# Patient Record
Sex: MALE | NOT HISPANIC OR LATINO | URBAN - METROPOLITAN AREA
[De-identification: names, ages, dates, MRNs, and addresses within clinical notes are randomized per-mention and may not be internally consistent; named-entity substitution may affect disease eponyms.]

---

## 2023-02-18 ENCOUNTER — INPATIENT (INPATIENT)
Facility: HOSPITAL | Age: 33
LOS: 5 days | Discharge: HOME | DRG: 711 | End: 2023-02-24
Attending: SURGERY | Admitting: SURGERY
Payer: MEDICAID

## 2023-02-18 VITALS — WEIGHT: 220.02 LBS

## 2023-02-18 DIAGNOSIS — L02.416 CUTANEOUS ABSCESS OF LEFT LOWER LIMB: ICD-10-CM

## 2023-02-18 DIAGNOSIS — M60.009 INFECTIVE MYOSITIS, UNSPECIFIED SITE: ICD-10-CM

## 2023-02-18 LAB
ALBUMIN SERPL ELPH-MCNC: 2.8 G/DL — LOW (ref 3.3–5)
ALP SERPL-CCNC: 44 U/L — SIGNIFICANT CHANGE UP (ref 40–120)
ALT FLD-CCNC: 325 U/L — HIGH (ref 12–78)
ANION GAP SERPL CALC-SCNC: 4 MMOL/L — LOW (ref 5–17)
APTT BLD: 29.7 SEC — SIGNIFICANT CHANGE UP (ref 27.5–35.5)
AST SERPL-CCNC: 155 U/L — HIGH (ref 15–37)
BASOPHILS # BLD AUTO: 0.05 K/UL — SIGNIFICANT CHANGE UP (ref 0–0.2)
BASOPHILS NFR BLD AUTO: 0.4 % — SIGNIFICANT CHANGE UP (ref 0–2)
BILIRUB SERPL-MCNC: 0.4 MG/DL — SIGNIFICANT CHANGE UP (ref 0.2–1.2)
BUN SERPL-MCNC: 20 MG/DL — SIGNIFICANT CHANGE UP (ref 7–23)
CALCIUM SERPL-MCNC: 9 MG/DL — SIGNIFICANT CHANGE UP (ref 8.5–10.1)
CHLORIDE SERPL-SCNC: 103 MMOL/L — SIGNIFICANT CHANGE UP (ref 96–108)
CO2 SERPL-SCNC: 27 MMOL/L — SIGNIFICANT CHANGE UP (ref 22–31)
CREAT SERPL-MCNC: 0.97 MG/DL — SIGNIFICANT CHANGE UP (ref 0.5–1.3)
EGFR: 107 ML/MIN/1.73M2 — SIGNIFICANT CHANGE UP
EOSINOPHIL # BLD AUTO: 0.03 K/UL — SIGNIFICANT CHANGE UP (ref 0–0.5)
EOSINOPHIL NFR BLD AUTO: 0.3 % — SIGNIFICANT CHANGE UP (ref 0–6)
FLUAV AG NPH QL: SIGNIFICANT CHANGE UP
FLUBV AG NPH QL: SIGNIFICANT CHANGE UP
GLUCOSE SERPL-MCNC: 119 MG/DL — HIGH (ref 70–99)
HCT VFR BLD CALC: 38.6 % — LOW (ref 39–50)
HGB BLD-MCNC: 13.1 G/DL — SIGNIFICANT CHANGE UP (ref 13–17)
IMM GRANULOCYTES NFR BLD AUTO: 0.4 % — SIGNIFICANT CHANGE UP (ref 0–0.9)
INR BLD: 1.9 RATIO — HIGH (ref 0.88–1.16)
LACTATE SERPL-SCNC: 1.1 MMOL/L — SIGNIFICANT CHANGE UP (ref 0.7–2)
LYMPHOCYTES # BLD AUTO: 1.05 K/UL — SIGNIFICANT CHANGE UP (ref 1–3.3)
LYMPHOCYTES # BLD AUTO: 8.8 % — LOW (ref 13–44)
MCHC RBC-ENTMCNC: 28.2 PG — SIGNIFICANT CHANGE UP (ref 27–34)
MCHC RBC-ENTMCNC: 33.9 GM/DL — SIGNIFICANT CHANGE UP (ref 32–36)
MCV RBC AUTO: 83.2 FL — SIGNIFICANT CHANGE UP (ref 80–100)
MONOCYTES # BLD AUTO: 1.16 K/UL — HIGH (ref 0–0.9)
MONOCYTES NFR BLD AUTO: 9.8 % — SIGNIFICANT CHANGE UP (ref 2–14)
NEUTROPHILS # BLD AUTO: 9.53 K/UL — HIGH (ref 1.8–7.4)
NEUTROPHILS NFR BLD AUTO: 80.3 % — HIGH (ref 43–77)
PLATELET # BLD AUTO: 395 K/UL — SIGNIFICANT CHANGE UP (ref 150–400)
POTASSIUM SERPL-MCNC: 4.2 MMOL/L — SIGNIFICANT CHANGE UP (ref 3.5–5.3)
POTASSIUM SERPL-SCNC: 4.2 MMOL/L — SIGNIFICANT CHANGE UP (ref 3.5–5.3)
PROT SERPL-MCNC: 7.8 GM/DL — SIGNIFICANT CHANGE UP (ref 6–8.3)
PROTHROM AB SERPL-ACNC: 22.2 SEC — HIGH (ref 10.5–13.4)
RBC # BLD: 4.64 M/UL — SIGNIFICANT CHANGE UP (ref 4.2–5.8)
RBC # FLD: 14.1 % — SIGNIFICANT CHANGE UP (ref 10.3–14.5)
RSV RNA NPH QL NAA+NON-PROBE: SIGNIFICANT CHANGE UP
SARS-COV-2 RNA SPEC QL NAA+PROBE: SIGNIFICANT CHANGE UP
SODIUM SERPL-SCNC: 134 MMOL/L — LOW (ref 135–145)
WBC # BLD: 11.87 K/UL — HIGH (ref 3.8–10.5)
WBC # FLD AUTO: 11.87 K/UL — HIGH (ref 3.8–10.5)

## 2023-02-18 PROCEDURE — 83690 ASSAY OF LIPASE: CPT

## 2023-02-18 PROCEDURE — 99285 EMERGENCY DEPT VISIT HI MDM: CPT

## 2023-02-18 PROCEDURE — 85230 CLOT FACTOR VII PROCONVERTIN: CPT

## 2023-02-18 PROCEDURE — 87070 CULTURE OTHR SPECIMN AEROBIC: CPT

## 2023-02-18 PROCEDURE — 83735 ASSAY OF MAGNESIUM: CPT

## 2023-02-18 PROCEDURE — 84443 ASSAY THYROID STIM HORMONE: CPT

## 2023-02-18 PROCEDURE — 81003 URINALYSIS AUTO W/O SCOPE: CPT

## 2023-02-18 PROCEDURE — 84590 ASSAY OF VITAMIN A: CPT

## 2023-02-18 PROCEDURE — 85384 FIBRINOGEN ACTIVITY: CPT

## 2023-02-18 PROCEDURE — 82728 ASSAY OF FERRITIN: CPT

## 2023-02-18 PROCEDURE — 83550 IRON BINDING TEST: CPT

## 2023-02-18 PROCEDURE — 84425 ASSAY OF VITAMIN B-1: CPT

## 2023-02-18 PROCEDURE — 0241U: CPT

## 2023-02-18 PROCEDURE — 84630 ASSAY OF ZINC: CPT

## 2023-02-18 PROCEDURE — 83036 HEMOGLOBIN GLYCOSYLATED A1C: CPT

## 2023-02-18 PROCEDURE — 87077 CULTURE AEROBIC IDENTIFY: CPT

## 2023-02-18 PROCEDURE — 84484 ASSAY OF TROPONIN QUANT: CPT

## 2023-02-18 PROCEDURE — 85379 FIBRIN DEGRADATION QUANT: CPT

## 2023-02-18 PROCEDURE — 80076 HEPATIC FUNCTION PANEL: CPT

## 2023-02-18 PROCEDURE — 93005 ELECTROCARDIOGRAM TRACING: CPT

## 2023-02-18 PROCEDURE — 82533 TOTAL CORTISOL: CPT

## 2023-02-18 PROCEDURE — 85025 COMPLETE CBC W/AUTO DIFF WBC: CPT

## 2023-02-18 PROCEDURE — 80061 LIPID PANEL: CPT

## 2023-02-18 PROCEDURE — 84207 ASSAY OF VITAMIN B-6: CPT

## 2023-02-18 PROCEDURE — 80202 ASSAY OF VANCOMYCIN: CPT

## 2023-02-18 PROCEDURE — 83615 LACTATE (LD) (LDH) ENZYME: CPT

## 2023-02-18 PROCEDURE — 84100 ASSAY OF PHOSPHORUS: CPT

## 2023-02-18 PROCEDURE — 82746 ASSAY OF FOLIC ACID SERUM: CPT

## 2023-02-18 PROCEDURE — 80074 ACUTE HEPATITIS PANEL: CPT

## 2023-02-18 PROCEDURE — 85014 HEMATOCRIT: CPT

## 2023-02-18 PROCEDURE — 83880 ASSAY OF NATRIURETIC PEPTIDE: CPT

## 2023-02-18 PROCEDURE — 82306 VITAMIN D 25 HYDROXY: CPT

## 2023-02-18 PROCEDURE — 80048 BASIC METABOLIC PNL TOTAL CA: CPT

## 2023-02-18 PROCEDURE — 73701 CT LOWER EXTREMITY W/DYE: CPT | Mod: LT

## 2023-02-18 PROCEDURE — 82607 VITAMIN B-12: CPT

## 2023-02-18 PROCEDURE — G1004: CPT

## 2023-02-18 PROCEDURE — 82550 ASSAY OF CK (CPK): CPT

## 2023-02-18 PROCEDURE — 84446 ASSAY OF VITAMIN E: CPT

## 2023-02-18 PROCEDURE — 93970 EXTREMITY STUDY: CPT

## 2023-02-18 PROCEDURE — 82150 ASSAY OF AMYLASE: CPT

## 2023-02-18 PROCEDURE — 85610 PROTHROMBIN TIME: CPT

## 2023-02-18 PROCEDURE — 85027 COMPLETE CBC AUTOMATED: CPT

## 2023-02-18 PROCEDURE — 76700 US EXAM ABDOM COMPLETE: CPT

## 2023-02-18 PROCEDURE — 86140 C-REACTIVE PROTEIN: CPT

## 2023-02-18 PROCEDURE — 85730 THROMBOPLASTIN TIME PARTIAL: CPT

## 2023-02-18 PROCEDURE — 71045 X-RAY EXAM CHEST 1 VIEW: CPT

## 2023-02-18 PROCEDURE — 85018 HEMOGLOBIN: CPT

## 2023-02-18 PROCEDURE — 83540 ASSAY OF IRON: CPT

## 2023-02-18 PROCEDURE — 82140 ASSAY OF AMMONIA: CPT

## 2023-02-18 PROCEDURE — 73701 CT LOWER EXTREMITY W/DYE: CPT | Mod: 26,LT,MG

## 2023-02-18 PROCEDURE — 80053 COMPREHEN METABOLIC PANEL: CPT

## 2023-02-18 PROCEDURE — 36415 COLL VENOUS BLD VENIPUNCTURE: CPT

## 2023-02-18 PROCEDURE — 12345: CPT | Mod: NC

## 2023-02-18 RX ORDER — METOCLOPRAMIDE HCL 10 MG
10 TABLET ORAL EVERY 6 HOURS
Refills: 0 | Status: DISCONTINUED | OUTPATIENT
Start: 2023-02-18 | End: 2023-02-24

## 2023-02-18 RX ORDER — IBUPROFEN 200 MG
1 TABLET ORAL
Qty: 0 | Refills: 0 | DISCHARGE

## 2023-02-18 RX ORDER — MORPHINE SULFATE 50 MG/1
4 CAPSULE, EXTENDED RELEASE ORAL ONCE
Refills: 0 | Status: DISCONTINUED | OUTPATIENT
Start: 2023-02-18 | End: 2023-02-18

## 2023-02-18 RX ORDER — ONDANSETRON 8 MG/1
4 TABLET, FILM COATED ORAL EVERY 6 HOURS
Refills: 0 | Status: DISCONTINUED | OUTPATIENT
Start: 2023-02-18 | End: 2023-02-24

## 2023-02-18 RX ORDER — SODIUM CHLORIDE 9 MG/ML
1000 INJECTION, SOLUTION INTRAVENOUS
Refills: 0 | Status: DISCONTINUED | OUTPATIENT
Start: 2023-02-18 | End: 2023-02-23

## 2023-02-18 RX ORDER — HYDROMORPHONE HYDROCHLORIDE 2 MG/ML
1 INJECTION INTRAMUSCULAR; INTRAVENOUS; SUBCUTANEOUS EVERY 4 HOURS
Refills: 0 | Status: DISCONTINUED | OUTPATIENT
Start: 2023-02-18 | End: 2023-02-23

## 2023-02-18 RX ORDER — ASCORBIC ACID 60 MG
1 TABLET,CHEWABLE ORAL
Qty: 0 | Refills: 0 | DISCHARGE

## 2023-02-18 RX ORDER — ACETAMINOPHEN 500 MG
650 TABLET ORAL EVERY 6 HOURS
Refills: 0 | Status: DISCONTINUED | OUTPATIENT
Start: 2023-02-18 | End: 2023-02-24

## 2023-02-18 RX ORDER — HYDROMORPHONE HYDROCHLORIDE 2 MG/ML
0.5 INJECTION INTRAMUSCULAR; INTRAVENOUS; SUBCUTANEOUS ONCE
Refills: 0 | Status: DISCONTINUED | OUTPATIENT
Start: 2023-02-18 | End: 2023-02-18

## 2023-02-18 RX ORDER — CALCIUM CARBONATE 500(1250)
3 TABLET ORAL EVERY 6 HOURS
Refills: 0 | Status: DISCONTINUED | OUTPATIENT
Start: 2023-02-18 | End: 2023-02-24

## 2023-02-18 RX ORDER — PHYTONADIONE (VIT K1) 5 MG
5 TABLET ORAL ONCE
Refills: 0 | Status: COMPLETED | OUTPATIENT
Start: 2023-02-18 | End: 2023-02-18

## 2023-02-18 RX ORDER — ONDANSETRON 8 MG/1
4 TABLET, FILM COATED ORAL ONCE
Refills: 0 | Status: COMPLETED | OUTPATIENT
Start: 2023-02-18 | End: 2023-02-18

## 2023-02-18 RX ADMIN — MORPHINE SULFATE 4 MILLIGRAM(S): 50 CAPSULE, EXTENDED RELEASE ORAL at 16:52

## 2023-02-18 RX ADMIN — ONDANSETRON 4 MILLIGRAM(S): 8 TABLET, FILM COATED ORAL at 14:47

## 2023-02-18 RX ADMIN — Medication 100 MILLIGRAM(S): at 23:01

## 2023-02-18 RX ADMIN — Medication 100 MILLIGRAM(S): at 12:51

## 2023-02-18 RX ADMIN — Medication 650 MILLIGRAM(S): at 21:37

## 2023-02-18 RX ADMIN — Medication 5 MILLIGRAM(S): at 23:01

## 2023-02-18 RX ADMIN — SODIUM CHLORIDE 125 MILLILITER(S): 9 INJECTION, SOLUTION INTRAVENOUS at 22:31

## 2023-02-18 RX ADMIN — MORPHINE SULFATE 4 MILLIGRAM(S): 50 CAPSULE, EXTENDED RELEASE ORAL at 14:48

## 2023-02-18 RX ADMIN — HYDROMORPHONE HYDROCHLORIDE 0.5 MILLIGRAM(S): 2 INJECTION INTRAMUSCULAR; INTRAVENOUS; SUBCUTANEOUS at 18:33

## 2023-02-18 NOTE — H&P ADULT - ASSESSMENT
This is an otherwise healthy 31 y/o male who presents with a left intramuscular thigh abscess.  -Admitted to the surgical service of Dr. Soto. Patient evaluated with him.   -Hemodynamically stable  -IV abx. ID consulltotherwise healthy who presented today with left thigh abscess x 2 weeks  -Medicine consult for  Incidental findings of elevated LFTs and PT/INR  -Vit K in prep for IR drainage tomorrow  -NPO at midnight

## 2023-02-18 NOTE — ED STATDOCS - OBJECTIVE STATEMENT
33 y/o male w/ no pertinent PMHx presents to the ED c/o left thigh abscess x2 weeks. Pt reports subjective fevers for the past 2 days. Pt was seen last week at UC, was put on Clinda for 3 days w/o relief, saw UC again and was told to stop taking it b/c it was likely a muscle strain. Pt has not had anything drained. Denies any puncture wound or scratches to the area. Pt endorses taking Ibuprofen w/o improvement. Allergies: Penicillin.

## 2023-02-18 NOTE — H&P ADULT - NSHPPHYSICALEXAM_GEN_ALL_CORE
T(C): 37.7 (02-18-23 @ 18:00), Max: 37.7 (02-18-23 @ 18:00)  HR: 83 (02-18-23 @ 18:00) (74 - 83)  BP: 129/73 (02-18-23 @ 18:00) (129/73 - 142/77)  RR: 20 (02-18-23 @ 18:00) (18 - 20)  SpO2: 98% (02-18-23 @ 18:00) (97% - 98%)    CONSTITUTIONAL: Well groomed, no apparent distress  RESP: CTA b/l  CV: RRR   SKIN: Left anteriolateral thigh tender and indurated, no drainage. Warm.

## 2023-02-18 NOTE — H&P ADULT - HISTORY OF PRESENT ILLNESS
33 y/o male otherwise healthy who presented today with left thigh abscess x 2 weeks. Pt reports subjective fevers for the past 2 days. Pt was seen last week at , was initially treated with Clinda x 3 days, though without improvement. He saw UC again and was told to stop taking antibiotic because it was likely a muscle strain. Pt has since noted increased firmness, no spontaneous drainage or drainage procedures. He reports a Vitamin B12 injection to the left thigh 3 weeks prior. Pt endorses taking Ibuprofen without improvement.

## 2023-02-18 NOTE — PATIENT PROFILE ADULT - STATED REASON FOR ADMISSION
I had fever they told me I had muscle strain but I have an abcess, I work out a lot maybe that's why, I have for over 2 weeks

## 2023-02-18 NOTE — ED STATDOCS - NS ED ROS FT
Gen: No fever, normal appetite  Eyes: No eye irritation or discharge  ENT: No ear pain, congestion, sore throat  Resp: No cough or trouble breathing  Cardiovascular: No chest pain or palpitation  Gastroenteric: No nausea/vomiting, diarrhea, constipation  :  No change in urine output; no dysuria  MS: No joint or muscle pain  Skin: +left thigh abscess  Neuro: No headache; no abnormal movements  Remainder negative, except as per the HPI

## 2023-02-18 NOTE — ED STATDOCS - PROGRESS NOTE DETAILS
Pt. is 32 year old male presenting with left thigh pain.  Pt. reports fever last night.  Pain and swelling x 2 weeks.  Pt. was evaluated at UC and placed on Clindamycin.  Pt. finished 3 day course.  Pt. was told to stop taking it by UC at second visit because they thought it was a muscle strain.  Pt. states he injected a vitamin B12 shot to left leg 3 weeks ago. Pt. admitted to Dr. Soto.  Cleared with PA.   Pt. approved to ailin Hanley PA-C

## 2023-02-18 NOTE — H&P ADULT - NSHPLABSRESULTS_GEN_ALL_CORE
Complete Blood Count + Automated Diff (02.18.23 @ 12:36)   WBC Count: 11.87 K/uL   RBC Count: 4.64 M/uL   Hemoglobin: 13.1 g/dL   Hematocrit: 38.6 %   Mean Cell Volume: 83.2 fl   Mean Cell Hemoglobin: 28.2 pg   Mean Cell Hemoglobin Conc: 33.9 gm/dL   Red Cell Distrib Width: 14.1 %   Platelet Count - Automated: 395 K/uL   Auto Neutrophil #: 9.53 K/uL   Auto Lymphocyte #: 1.05 K/uL   Auto Monocyte #: 1.16 K/uL   Auto Eosinophil #: 0.03 K/uL   Auto Basophil #: 0.05 K/uL   Auto Neutrophil %: 80.3: Differential percentages must be correlated with absolute numbers for   clinical significance. %   Auto Lymphocyte %: 8.8 %   Auto Monocyte %: 9.8 %   Auto Eosinophil %: 0.3 %   Auto Basophil %: 0.4 %     Comprehensive Metabolic Panel (02.18.23 @ 12:36)   Sodium, Serum: 134 mmol/L   Potassium, Serum: 4.2 mmol/L   Chloride, Serum: 103 mmol/L   Carbon Dioxide, Serum: 27 mmol/L   Anion Gap, Serum: 4 mmol/L   Blood Urea Nitrogen, Serum: 20 mg/dL   Creatinine, Serum: 0.97 mg/dL   Glucose, Serum: 119 mg/dL   Calcium, Total Serum: 9.0 mg/dL   Protein Total, Serum: 7.8 gm/dL   Albumin, Serum: 2.8 g/dL   Bilirubin Total, Serum: 0.4 mg/dL   Alkaline Phosphatase, Serum: 44 U/L   Aspartate Aminotransferase (AST/SGOT): 155 U/L   Alanine Aminotransferase (ALT/SGPT): 325 U/L   eGFR: 107:     Prothrombin Time and INR, Plasma (02.18.23 @ 12:36)   Prothrombin Time, Plasma: 22.2 sec   INR: 1.90:Lactate, Blood: 1.1 mmol/L (02.18.23 @ 12:36)     CT FINDINGS:  	  	There is a large peripherally enhancing complex appearing fluid   	collection at the lateral aspect of the vastus lateralis muscle at the   	level the proximal third of the femur that measures 5.8 x 9.2 x 15 cm   	consistent with an intramuscular abscess. There is adjacent subcutaneous   	soft tissue edema.  	  	There is no acute osseous abnormality.  	  	The intrapelvic soft tissues are within normal limits.  	  	IMPRESSION:  	  	Large peripherally enhancing complex appearing fluid collection at the   	lateral aspect of the vastus lateralis muscle at the level the proximal   	third of the femur consistent with an intramuscular abscess. Adjacent   	soft tissue swelling compatible with cellulitis.

## 2023-02-18 NOTE — ED STATDOCS - CCCP TRG CHIEF CMPLNT
Alert-The patient is alert, awake and responds to voice. The patient is oriented to time, place, and person. The triage nurse is able to obtain subjective information. see chief complaint quote

## 2023-02-18 NOTE — ED ADULT TRIAGE NOTE - CHIEF COMPLAINT QUOTE
Pt c/o abscess to the L thigh x2 weeks with fevers starting on Thursday. Was starting on Clindamycin by urgent care with no relief. +redness, swelling, and warmth.

## 2023-02-18 NOTE — ED ADULT NURSE NOTE - OBJECTIVE STATEMENT
"CONTACT:   Maria A Heaton RN  Phone: [phone]  Fax: [phone]    DISCHARGE NOTIFICATION    DISCHARGE TO:Home/self care     REF # [id number]  DC DATE: 4/21/21    IF YOU NEED ANYTHING FURTHER PLEASE LET ME KNOW.   THANKS     Donavon Colby (49 y.o. Male)     Date of Birth Social Security Number Address Home Phone MRN    1971  [address] [place] [address] [phone] [id number]    Confucianism Marital Status          [place]        Admission Date Admission Type Admitting Provider Attending Provider Department, Room/Bed    4/18/21 Emergency Iván Becker MD  [place] [id number]/    Discharge Date Discharge Disposition Discharge Destination        4/21/2021 Home or Self Care              Attending Provider: (none)   Allergies: Penicillins    Isolation: Spore   Infection: Norovirus (04/20/21)   Code Status: CPR    Ht: 193 cm (75.98\")   Wt: 129 kg (283 lb 14.4 oz)    Admission Cmt: None   Principal Problem: None                Active Insurance as of 4/18/2021     Primary Coverage     Payor Plan Insurance Group Employer/Plan Group    [facility] [employer]UE CR[employer] [id number]     Payor Plan Address Payor Plan Phone Number Payor Plan Fax Number Effective Dates    [address] [phone]  1/1/2015 - None Entered    [place] [address]       Subscriber Name Subscriber Birth Date Member ID       DONAVON COLBY 1971 [id number]                 Emergency Contacts      (Rel.) Home Phone Work Phone Mobile Phone    torin colby (Daughter) [phone] -- --            Discharge Summary    No notes of this type exist for this encounter.         Discharge Order (From admission, onward)     Start     Ordered    04/21/21 1128  Discharge patient  Once     Expected Discharge Date: 04/21/21    Discharge Disposition: Home or Self Care    Physician of Record for Attribution - Please select from Treatment Team: IVÁN BECKER [[id number]] "    Review needed by CMO to determine Physician of Record: No       Question Answer Comment   Physician of Record for Attribution - Please select from Treatment Team MODESTA BECKER    Review needed by CMO to determine Physician of Record No        04/21/21 1124                 Patient c/o left thigh pain for about a week and a half. Patient has been seen at urgent care three times. Patient has had fever and chills for the past few days. Patient is a  and gave himself a b12 shot a few months ago. Denies CP, SOB.

## 2023-02-18 NOTE — ED STATDOCS - PHYSICAL EXAMINATION
GEN - NAD; well appearing; A+O x3   HEAD - NC/AT     EYES - EOMI, no conjunctival pallor, no scleral icterus  ENT -   mucous membranes  moist , no discharge      NECK - Neck supple  PULM - CTA b/l,  symmetric breath sounds  COR -  RRR, S1 S2, no murmurs  ABD - , ND, NT, soft, no guarding, no rebound, no masses    BACK - no CVA tenderness, nontender spine     EXTREMS - no edema, no deformity, warm and well perfused    SKIN - Left lateral thigh w/ swelling, erythema and induration   NEUROLOGIC - alert, sensation nl, motor 5/5 RUE/LUE/RLE/LLE

## 2023-02-19 LAB
ALBUMIN SERPL ELPH-MCNC: 2.3 G/DL — LOW (ref 3.3–5)
ALP SERPL-CCNC: 36 U/L — LOW (ref 40–120)
ALT FLD-CCNC: 220 U/L — HIGH (ref 12–78)
AMMONIA BLD-MCNC: 18 UMOL/L — SIGNIFICANT CHANGE UP (ref 11–32)
AMYLASE P1 CFR SERPL: 43 U/L — SIGNIFICANT CHANGE UP (ref 25–115)
ANION GAP SERPL CALC-SCNC: 3 MMOL/L — LOW (ref 5–17)
APPEARANCE UR: CLEAR — SIGNIFICANT CHANGE UP
AST SERPL-CCNC: 72 U/L — HIGH (ref 15–37)
BASOPHILS # BLD AUTO: 0.03 K/UL — SIGNIFICANT CHANGE UP (ref 0–0.2)
BASOPHILS NFR BLD AUTO: 0.3 % — SIGNIFICANT CHANGE UP (ref 0–2)
BILIRUB SERPL-MCNC: 0.4 MG/DL — SIGNIFICANT CHANGE UP (ref 0.2–1.2)
BILIRUB UR-MCNC: NEGATIVE — SIGNIFICANT CHANGE UP
BUN SERPL-MCNC: 16 MG/DL — SIGNIFICANT CHANGE UP (ref 7–23)
CALCIUM SERPL-MCNC: 8.5 MG/DL — SIGNIFICANT CHANGE UP (ref 8.5–10.1)
CHLORIDE SERPL-SCNC: 101 MMOL/L — SIGNIFICANT CHANGE UP (ref 96–108)
CK SERPL-CCNC: 113 U/L — SIGNIFICANT CHANGE UP (ref 26–308)
CO2 SERPL-SCNC: 31 MMOL/L — SIGNIFICANT CHANGE UP (ref 22–31)
COLOR SPEC: YELLOW — SIGNIFICANT CHANGE UP
CREAT SERPL-MCNC: 0.97 MG/DL — SIGNIFICANT CHANGE UP (ref 0.5–1.3)
DIFF PNL FLD: NEGATIVE — SIGNIFICANT CHANGE UP
EGFR: 107 ML/MIN/1.73M2 — SIGNIFICANT CHANGE UP
EOSINOPHIL # BLD AUTO: 0.08 K/UL — SIGNIFICANT CHANGE UP (ref 0–0.5)
EOSINOPHIL NFR BLD AUTO: 0.7 % — SIGNIFICANT CHANGE UP (ref 0–6)
GLUCOSE SERPL-MCNC: 115 MG/DL — HIGH (ref 70–99)
GLUCOSE UR QL: NEGATIVE — SIGNIFICANT CHANGE UP
HCT VFR BLD CALC: 36.3 % — LOW (ref 39–50)
HGB BLD-MCNC: 12.5 G/DL — LOW (ref 13–17)
IMM GRANULOCYTES NFR BLD AUTO: 0.8 % — SIGNIFICANT CHANGE UP (ref 0–0.9)
INR BLD: 1.94 RATIO — HIGH (ref 0.88–1.16)
KETONES UR-MCNC: NEGATIVE — SIGNIFICANT CHANGE UP
LDH SERPL L TO P-CCNC: 180 U/L — SIGNIFICANT CHANGE UP (ref 84–241)
LEUKOCYTE ESTERASE UR-ACNC: NEGATIVE — SIGNIFICANT CHANGE UP
LIDOCAIN IGE QN: 77 U/L — SIGNIFICANT CHANGE UP (ref 73–393)
LYMPHOCYTES # BLD AUTO: 1.03 K/UL — SIGNIFICANT CHANGE UP (ref 1–3.3)
LYMPHOCYTES # BLD AUTO: 9.5 % — LOW (ref 13–44)
MCHC RBC-ENTMCNC: 28.7 PG — SIGNIFICANT CHANGE UP (ref 27–34)
MCHC RBC-ENTMCNC: 34.4 GM/DL — SIGNIFICANT CHANGE UP (ref 32–36)
MCV RBC AUTO: 83.4 FL — SIGNIFICANT CHANGE UP (ref 80–100)
MONOCYTES # BLD AUTO: 1.12 K/UL — HIGH (ref 0–0.9)
MONOCYTES NFR BLD AUTO: 10.3 % — SIGNIFICANT CHANGE UP (ref 2–14)
NEUTROPHILS # BLD AUTO: 8.49 K/UL — HIGH (ref 1.8–7.4)
NEUTROPHILS NFR BLD AUTO: 78.4 % — HIGH (ref 43–77)
NITRITE UR-MCNC: NEGATIVE — SIGNIFICANT CHANGE UP
PH UR: 6 — SIGNIFICANT CHANGE UP (ref 5–8)
PLATELET # BLD AUTO: 371 K/UL — SIGNIFICANT CHANGE UP (ref 150–400)
POTASSIUM SERPL-MCNC: 4 MMOL/L — SIGNIFICANT CHANGE UP (ref 3.5–5.3)
POTASSIUM SERPL-SCNC: 4 MMOL/L — SIGNIFICANT CHANGE UP (ref 3.5–5.3)
PROT SERPL-MCNC: 6.8 GM/DL — SIGNIFICANT CHANGE UP (ref 6–8.3)
PROT UR-MCNC: NEGATIVE — SIGNIFICANT CHANGE UP
PROTHROM AB SERPL-ACNC: 22.7 SEC — HIGH (ref 10.5–13.4)
RBC # BLD: 4.35 M/UL — SIGNIFICANT CHANGE UP (ref 4.2–5.8)
RBC # FLD: 13.8 % — SIGNIFICANT CHANGE UP (ref 10.3–14.5)
SODIUM SERPL-SCNC: 135 MMOL/L — SIGNIFICANT CHANGE UP (ref 135–145)
SP GR SPEC: 1.01 — SIGNIFICANT CHANGE UP (ref 1.01–1.02)
TSH SERPL-MCNC: 3.33 UU/ML — SIGNIFICANT CHANGE UP (ref 0.34–4.82)
UROBILINOGEN FLD QL: 8
WBC # BLD: 10.84 K/UL — HIGH (ref 3.8–10.5)
WBC # FLD AUTO: 10.84 K/UL — HIGH (ref 3.8–10.5)

## 2023-02-19 PROCEDURE — 99253 IP/OBS CNSLTJ NEW/EST LOW 45: CPT

## 2023-02-19 PROCEDURE — 71045 X-RAY EXAM CHEST 1 VIEW: CPT | Mod: 26

## 2023-02-19 RX ORDER — CEFEPIME 1 G/1
2000 INJECTION, POWDER, FOR SOLUTION INTRAMUSCULAR; INTRAVENOUS ONCE
Refills: 0 | Status: COMPLETED | OUTPATIENT
Start: 2023-02-19 | End: 2023-02-19

## 2023-02-19 RX ORDER — PHYTONADIONE (VIT K1) 5 MG
10 TABLET ORAL ONCE
Refills: 0 | Status: COMPLETED | OUTPATIENT
Start: 2023-02-19 | End: 2023-02-19

## 2023-02-19 RX ORDER — CEFEPIME 1 G/1
2000 INJECTION, POWDER, FOR SOLUTION INTRAMUSCULAR; INTRAVENOUS EVERY 12 HOURS
Refills: 0 | Status: DISCONTINUED | OUTPATIENT
Start: 2023-02-20 | End: 2023-02-23

## 2023-02-19 RX ORDER — VANCOMYCIN HCL 1 G
1500 VIAL (EA) INTRAVENOUS EVERY 12 HOURS
Refills: 0 | Status: DISCONTINUED | OUTPATIENT
Start: 2023-02-19 | End: 2023-02-21

## 2023-02-19 RX ORDER — CEFEPIME 1 G/1
INJECTION, POWDER, FOR SOLUTION INTRAMUSCULAR; INTRAVENOUS
Refills: 0 | Status: DISCONTINUED | OUTPATIENT
Start: 2023-02-19 | End: 2023-02-23

## 2023-02-19 RX ORDER — ACETAMINOPHEN 500 MG
975 TABLET ORAL ONCE
Refills: 0 | Status: COMPLETED | OUTPATIENT
Start: 2023-02-19 | End: 2023-02-19

## 2023-02-19 RX ADMIN — Medication 100 MILLIGRAM(S): at 13:15

## 2023-02-19 RX ADMIN — Medication 102 MILLIGRAM(S): at 22:13

## 2023-02-19 RX ADMIN — Medication 100 MILLIGRAM(S): at 06:13

## 2023-02-19 RX ADMIN — Medication 975 MILLIGRAM(S): at 22:50

## 2023-02-19 RX ADMIN — Medication 650 MILLIGRAM(S): at 18:04

## 2023-02-19 RX ADMIN — Medication 650 MILLIGRAM(S): at 06:00

## 2023-02-19 RX ADMIN — Medication 300 MILLIGRAM(S): at 17:33

## 2023-02-19 RX ADMIN — SODIUM CHLORIDE 125 MILLILITER(S): 9 INJECTION, SOLUTION INTRAVENOUS at 06:13

## 2023-02-19 RX ADMIN — Medication 650 MILLIGRAM(S): at 16:11

## 2023-02-19 RX ADMIN — Medication 650 MILLIGRAM(S): at 05:16

## 2023-02-19 RX ADMIN — CEFEPIME 100 MILLIGRAM(S): 1 INJECTION, POWDER, FOR SOLUTION INTRAMUSCULAR; INTRAVENOUS at 17:00

## 2023-02-19 RX ADMIN — Medication 975 MILLIGRAM(S): at 23:45

## 2023-02-19 NOTE — PROGRESS NOTE ADULT - SUBJECTIVE AND OBJECTIVE BOX
Resting comfortably no complaints  VSS Tm 102, To 99   Left lateral thigh edematous, palpable mass, non tender to palp. No warmth to touch

## 2023-02-19 NOTE — CONSULT NOTE ADULT - ASSESSMENT
33 y/o male   on multiple supplements ( Creatinine, glutathione,  whey protein, collagen, apple cider vinegar, injections of B12, B6 vitamins)   otherwise healthy who presented today with left thigh abscess x 2 weeks. Pt reports subjective fevers for the past 2 days. Pt was seen last week at , was initially treated with Clinda x 3 days, though without improvement.    Assessment/Plan:    Left thigh pain , Fever due to Sepsis, POA , LLE cellulitis with intramuscular abscess  - as per surgery   - c/w IV abx cefepime and vanco   - f/u cultures  - plan for IR drainage     Transaminitis r/o liver pathology  - check lipid profile, lipase, amylase, hepatitis acute panel  - US abd - to r/o liver and GB pathology  - on multiple supplements ( Creatinine, glutathione,  whey protein, collagen, apple cider vinegar, injections of B12, B6 vitamins)    - check vitamin B12, B6, vit D level, TSH, A1C     Hyperglycemia - check TSH, A1C     DVT PPX: as per surgery    Advance Directive:  - Full code, diagnosis, prognosis discussed  - 17 minutes spend    Disposition: as per surgery     Time Span: 75 min     Thank you for consult, will follow with you.

## 2023-02-19 NOTE — CONSULT NOTE ADULT - SUBJECTIVE AND OBJECTIVE BOX
Chief Complaint: left thigh pain , fevers     HPI:  33 y/o male   on multiple supplements ( Creatinine, glutathione,  whey protein, collagen, apple cider vinegar, injections of B12, B6 vitamins)   otherwise healthy who presented today with left thigh abscess x 2 weeks. Pt reports subjective fevers for the past 2 days. Pt was seen last week at , was initially treated with Clinda x 3 days, though without improvement. He saw UC again and was told to stop taking antibiotic because it was likely a muscle strain. Pt has since noted increased firmness, no spontaneous drainage or drainage procedures. He reports a Vitamin B12 injection to the left thigh 3 weeks prior. Pt endorses taking Ibuprofen without improvement.       2/19  - pt seen and examined, reports severe left thigh pain, febrile, tolerating po intake, denies cp, palpitations, abdominal pain, diarrhea, nausea       REVIEW OF SYSTEMS:    CONSTITUTIONAL: No weakness, + fevers +  chills  EYES/ENT: No visual changes;  No vertigo or throat pain   NECK: No pain or stiffness  RESPIRATORY: No cough, wheezing, hemoptysis; No shortness of breath  CARDIOVASCULAR: No chest pain or palpitations  GASTROINTESTINAL: No abdominal or epigastric pain. No nausea, vomiting, or hematemesis; No diarrhea or constipation. No melena or hematochezia.  GENITOURINARY: No dysuria, frequency or hematuria  NEUROLOGICAL: No numbness or weakness  SKIN: No itching, burning, rashes, or lesions   All other review of systems is negative unless indicated above    PAST MEDICAL & SURGICAL HISTORY:  No significant past surgical history          Social history : Denies ETOH use, IVDU, smoking   No pertinent history of stroke, MI, cancer , DM  in first degree relatives.    Vital Signs Last 24 Hrs  T(C): 37.7 (19 Feb 2023 19:13), Max: 39.3 (19 Feb 2023 16:20)  T(F): 99.8 (19 Feb 2023 19:13), Max: 102.8 (19 Feb 2023 16:20)  HR: 101 (19 Feb 2023 16:20) (82 - 101)  BP: 128/70 (19 Feb 2023 16:20) (117/55 - 140/68)  BP(mean): 92 (18 Feb 2023 21:27) (92 - 92)  RR: 18 (19 Feb 2023 16:20) (18 - 19)  SpO2: 97% (19 Feb 2023 16:20) (96% - 99%)    Parameters below as of 19 Feb 2023 16:20  Patient On (Oxygen Delivery Method): room air        I&O's Summary    18 Feb 2023 07:01  -  19 Feb 2023 07:00  --------------------------------------------------------  IN: 1225 mL / OUT: 0 mL / NET: 1225 mL    19 Feb 2023 07:01  -  19 Feb 2023 20:36  --------------------------------------------------------  IN: 670 mL / OUT: 300 mL / NET: 370 mL        CAPILLARY BLOOD GLUCOSE          PHYSICAL EXAM:    Constitutional: NAD, awake and alert, well-developed  HEENT: PERR, EOMI, Normal Hearing, MMM  Neck: Soft and supple, No LAD, No JVD  Respiratory: Breath sounds are clear bilaterally, No wheezing, rales or rhonchi  Cardiovascular: S1 and S2, regular rate and rhythm, no Murmurs, gallops or rubs  Gastrointestinal: Bowel Sounds present, soft, nontender, nondistended, no guarding, no rebound  Extremities: No peripheral edema  Vascular: 2+ peripheral pulses  Neurological: A/O x 3, no focal deficits  Musculoskeletal: 5/5 strength b/l upper and lower extremities, + left thigh swelling and tense lesion   Skin: No rashes    Medications:  MEDICATIONS  (STANDING):  cefepime   IVPB      lactated ringers. 1000 milliLiter(s) (100 mL/Hr) IV Continuous <Continuous>  vancomycin  IVPB 1500 milliGRAM(s) IV Intermittent every 12 hours      Labs: All Labs Reviewed:                        12.5   10.84 )-----------( 371      ( 19 Feb 2023 07:14 )             36.3     02-19    135  |  101  |  16  ----------------------------<  115<H>  4.0   |  31  |  0.97    Ca    8.5      19 Feb 2023 07:14    TPro  6.8  /  Alb  2.3<L>  /  TBili  0.4  /  DBili  x   /  AST  72<H>  /  ALT  220<H>  /  AlkPhos  36<L>  02-19    PT/INR - ( 19 Feb 2023 07:14 )   PT: 22.7 sec;   INR: 1.94 ratio         PTT - ( 18 Feb 2023 12:36 )  PTT:29.7 sec    Culture - Blood (02.18.23 @ 12:36)    Specimen Source: .Blood None    Culture Results:   No growth to date.    Culture - Blood (02.18.23 @ 12:36)    Specimen Source: .Blood None    Culture Results:   No growth to date.      Blood Culture: Organism --  Gram Stain Blood -- Gram Stain --  Specimen Source .Blood None  Culture-Blood --        RADIOLOGY/EKG: all reviewed   < from: 12 Lead ECG (02.18.23 @ 11:53) >  Ventricular Rate 76 BPM    Atrial Rate 76 BPM    P-R Interval 168 ms    QRS Duration 96 ms    Q-T Interval 358 ms    QTC Calculation(Bazett) 402 ms    P Axis 57 degrees    R Axis 42 degrees    T Axis -24 degrees    Diagnosis Line Normal sinus rhythm  Nonspecific T wave abnormality  No previous ECGs available    < end of copied text >  < from: CT Lower Extremity w/ IV Cont, Left (02.18.23 @ 13:26) >    Large peripherally enhancing complex appearing fluid collection at the   lateral aspect of the vastus lateralis muscle at the level the proximal   third of the femur consistent with an intramuscular abscess. Adjacent   soft tissue swelling compatible with cellulitis.    < end of copied text >

## 2023-02-19 NOTE — CONSULT NOTE ADULT - ASSESSMENT
31 y/o male otherwise healthy was admitted on 2/18 with left thigh abscess x 2 weeks. Pt reports subjective fevers for the past 2 days PTA. Pt was seen last week at , was initially treated with Clinda x 3 days, though without improvement. He saw  again and was told to stop taking antibiotic because it was likely a muscle strain. Pt has since noted increased firmness, no spontaneous drainage or drainage procedures. He reports a Vitamin B12 injection to the left thigh 3 weeks prior. Pt endorses taking Ibuprofen without improvement. In ER he received clindamycin IV.    1. Left thigh cellulitis and abscess. Allergy to PCN with anaphylaxis.   -febrile syndrome  -obtain BC x 2  -plan for IR drainage  -agree with clindamycin 900 mg IV q8h  -reason for abx use and side effects reviewed with patient; monitor BMP   -surgical evaluation  -f/u cultures  -old chart reviewed to assess prior cultures  -monitor temps  -f/u CBC  -supportive care  2. Other issues:   -care per medicine     31 y/o male otherwise healthy was admitted on 2/18 with left thigh abscess x 2 weeks. Pt reports subjective fevers for the past 2 days PTA. Pt was seen last week at , was initially treated with Clinda x 3 days, though without improvement. He saw  again and was told to stop taking antibiotic because it was likely a muscle strain. Pt has since noted increased firmness, no spontaneous drainage or drainage procedures. He reports a Vitamin B12 injection to the left thigh 3 weeks prior. Pt endorses taking Ibuprofen without improvement. In ER he received clindamycin IV.    1. Left thigh cellulitis and abscess. Allergy to PCN with rash.   -febrile syndrome  -obtain BC x 2  -plan for IR drainage  -start vancomycin 1500 mg IV q12h and cefepime 2 gm IV q12h  -reason for abx use and side effects reviewed with patient; monitor BMP   -monitor closely in karie of PCN allergy history  -surgical evaluation  -f/u cultures  -old chart reviewed to assess prior cultures  -monitor temps  -f/u CBC  -supportive care  2. Other issues:   -care per medicine

## 2023-02-19 NOTE — PROVIDER CONTACT NOTE (OTHER) - SITUATION
notified of consult.
called md to determine if IR procedure would be today for patient. told by MD that IR is only on call for emergencies so patient would be put on regular diet and NPO tonight at midnight.

## 2023-02-19 NOTE — CONSULT NOTE ADULT - SUBJECTIVE AND OBJECTIVE BOX
Patient is a 32y old  Male who presents with a chief complaint of Left thigh abscess     HPI:  33 y/o male otherwise healthy was admitted on 2/18 with left thigh abscess x 2 weeks. Pt reports subjective fevers for the past 2 days PTA. Pt was seen last week at , was initially treated with Clinda x 3 days, though without improvement. He saw UC again and was told to stop taking antibiotic because it was likely a muscle strain. Pt has since noted increased firmness, no spontaneous drainage or drainage procedures. He reports a Vitamin B12 injection to the left thigh 3 weeks prior. Pt endorses taking Ibuprofen without improvement. In ER he received clindamycin IV.     PMH: as above  PSH: as above  Meds: per reconciliation sheet, noted below  MEDICATIONS  (STANDING):  clindamycin IVPB 600 milliGRAM(s) IV Intermittent every 8 hours  lactated ringers. 1000 milliLiter(s) (100 mL/Hr) IV Continuous <Continuous>    MEDICATIONS  (PRN):  acetaminophen     Tablet .. 650 milliGRAM(s) Oral every 6 hours PRN Temp greater or equal to 38C (100.4F), Mild Pain (1 - 3)  aluminum hydroxide/magnesium hydroxide/simethicone Suspension 30 milliLiter(s) Oral every 4 hours PRN Dyspepsia  bisacodyl 5 milliGRAM(s) Oral daily PRN Constipation  bisacodyl Suppository 10 milliGRAM(s) Rectal once PRN Constipation  calcium carbonate    500 mG (Tums) Chewable 3 Tablet(s) Chew every 6 hours PRN Dyspepsia  HYDROmorphone  Injectable 1 milliGRAM(s) IV Push every 4 hours PRN Severe Pain (7 - 10)  metoclopramide Injectable 10 milliGRAM(s) IV Push every 6 hours PRN Nausea and/or Vomiting not relieved by Zofran  ondansetron Injectable 4 milliGRAM(s) IV Push every 6 hours PRN Nausea  oxycodone    5 mG/acetaminophen 325 mG 1 Tablet(s) Oral every 4 hours PRN Moderate Pain (4 - 6)    Allergies    penicillin (Anaphylaxis)    Intolerances      Social: no smoking, no alcohol, no illegal drugs; no recent travel, no exposure to TB  FAMILY HISTORY:    no history of premature cardiovascular disease in first degree relatives    ROS: the patient denies fever, no chills, no HA, no seizures, no dizziness, no sore throat, no nasal congestion, no blurry vision, no CP, no palpitations, no SOB, no cough, no abdominal pain, no diarrhea, no N/V, no dysuria, no leg pain, no claudication, no rash, no joint aches, no rectal pain or bleeding, no night sweats  All other systems reviewed and are negative    Vital Signs Last 24 Hrs  T(C): 37.3 (19 Feb 2023 13:12), Max: 38.6 (19 Feb 2023 05:20)  T(F): 99.2 (19 Feb 2023 13:12), Max: 101.4 (19 Feb 2023 05:20)  HR: 82 (19 Feb 2023 13:12) (82 - 93)  BP: 140/68 (19 Feb 2023 13:12) (117/55 - 140/68)  BP(mean): 92 (18 Feb 2023 21:27) (92 - 92)  RR: 18 (19 Feb 2023 13:12) (18 - 20)  SpO2: 97% (19 Feb 2023 13:12) (96% - 99%)    Parameters below as of 19 Feb 2023 13:12  Patient On (Oxygen Delivery Method): room air    PE:    Constitutional:  No acute distress  HEENT: NC/AT, EOMI, PERRLA, conjunctivae clear; ears and nose atraumatic; pharynx benign  Neck: supple; thyroid not palpable  Back: no tenderness  Respiratory: respiratory effort normal; clear to auscultation  Cardiovascular: S1S2 regular, no murmurs  Abdomen: soft, not tender, not distended, positive BS; no liver or spleen organomegaly  Genitourinary: no suprapubic tenderness  Lymphatic: no LN palpable  Musculoskeletal: no muscle tenderness, no joint swelling or tenderness  Extremities: no pedal edema  Left thigh tenderness  Neurological/ Psychiatric: AxOx3, judgement and insight normal; moving all extremities  Skin: no rashes; no palpable lesions    Labs: all available labs reviewed                        12.5   10.84 )-----------( 371      ( 19 Feb 2023 07:14 )             36.3     02-19    135  |  101  |  16  ----------------------------<  115<H>  4.0   |  31  |  0.97    Ca    8.5      19 Feb 2023 07:14    TPro  6.8  /  Alb  2.3<L>  /  TBili  0.4  /  DBili  x   /  AST  72<H>  /  ALT  220<H>  /  AlkPhos  36<L>  02-19     LIVER FUNCTIONS - ( 19 Feb 2023 07:14 )  Alb: 2.3 g/dL / Pro: 6.8 gm/dL / ALK PHOS: 36 U/L / ALT: 220 U/L / AST: 72 U/L / GGT: x               Radiology: all available radiological tests reviewed    < from: CT Lower Extremity w/ IV Cont, Left (02.18.23 @ 13:26) >  Large peripherally enhancing complex appearing fluid collection at the   lateral aspect of the vastus lateralis muscle at the level the proximal   third of the femur consistent with an intramuscular abscess. Adjacent   soft tissue swelling compatible with cellulitis.  < end of copied text >      Advanced directives addressed: full resuscitation Patient is a 32y old  Male who presents with a chief complaint of Left thigh abscess     HPI:  31 y/o male otherwise healthy was admitted on 2/18 with left thigh abscess x 2 weeks. Pt reports subjective fevers for the past 2 days PTA. Pt was seen last week at , was initially treated with Clinda x 3 days, though without improvement. He saw UC again and was told to stop taking antibiotic because it was likely a muscle strain. Pt has since noted increased firmness, no spontaneous drainage or drainage procedures. He reports a Vitamin B12 injection to the left thigh 3 weeks prior. Pt endorses taking Ibuprofen without improvement. In ER he received clindamycin IV.     PMH: as above  PSH: as above  Meds: per reconciliation sheet, noted below  MEDICATIONS  (STANDING):  clindamycin IVPB 600 milliGRAM(s) IV Intermittent every 8 hours  lactated ringers. 1000 milliLiter(s) (100 mL/Hr) IV Continuous <Continuous>    MEDICATIONS  (PRN):  acetaminophen     Tablet .. 650 milliGRAM(s) Oral every 6 hours PRN Temp greater or equal to 38C (100.4F), Mild Pain (1 - 3)  aluminum hydroxide/magnesium hydroxide/simethicone Suspension 30 milliLiter(s) Oral every 4 hours PRN Dyspepsia  bisacodyl 5 milliGRAM(s) Oral daily PRN Constipation  bisacodyl Suppository 10 milliGRAM(s) Rectal once PRN Constipation  calcium carbonate    500 mG (Tums) Chewable 3 Tablet(s) Chew every 6 hours PRN Dyspepsia  HYDROmorphone  Injectable 1 milliGRAM(s) IV Push every 4 hours PRN Severe Pain (7 - 10)  metoclopramide Injectable 10 milliGRAM(s) IV Push every 6 hours PRN Nausea and/or Vomiting not relieved by Zofran  ondansetron Injectable 4 milliGRAM(s) IV Push every 6 hours PRN Nausea  oxycodone    5 mG/acetaminophen 325 mG 1 Tablet(s) Oral every 4 hours PRN Moderate Pain (4 - 6)    Allergies    penicillin - rash    Intolerances      Social: no smoking, no alcohol, no illegal drugs; no recent travel, no exposure to TB  FAMILY HISTORY:    no history of premature cardiovascular disease in first degree relatives    ROS: the patient denies fever, no chills, no HA, no seizures, no dizziness, no sore throat, no nasal congestion, no blurry vision, no CP, no palpitations, no SOB, no cough, no abdominal pain, no diarrhea, no N/V, no dysuria, has right thigh pain, no claudication, no rash, no joint aches, no rectal pain or bleeding, no night sweats  All other systems reviewed and are negative    Vital Signs Last 24 Hrs  T(C): 37.3 (19 Feb 2023 13:12), Max: 38.6 (19 Feb 2023 05:20)  T(F): 99.2 (19 Feb 2023 13:12), Max: 101.4 (19 Feb 2023 05:20)  HR: 82 (19 Feb 2023 13:12) (82 - 93)  BP: 140/68 (19 Feb 2023 13:12) (117/55 - 140/68)  BP(mean): 92 (18 Feb 2023 21:27) (92 - 92)  RR: 18 (19 Feb 2023 13:12) (18 - 20)  SpO2: 97% (19 Feb 2023 13:12) (96% - 99%)    Parameters below as of 19 Feb 2023 13:12  Patient On (Oxygen Delivery Method): room air    PE:    Constitutional:  No acute distress  HEENT: NC/AT, EOMI, PERRLA, conjunctivae clear; ears and nose atraumatic; pharynx benign  Neck: supple; thyroid not palpable  Back: no tenderness  Respiratory: respiratory effort normal; clear to auscultation  Cardiovascular: S1S2 regular, no murmurs  Abdomen: soft, not tender, not distended, positive BS; no liver or spleen organomegaly  Genitourinary: no suprapubic tenderness  Lymphatic: no LN palpable  Musculoskeletal: no muscle tenderness, no joint swelling or tenderness  Extremities: no pedal edema  Left thigh tenderness  Neurological/ Psychiatric: AxOx3, judgement and insight normal; moving all extremities  Skin: no rashes; no palpable lesions    Labs: all available labs reviewed                        12.5   10.84 )-----------( 371      ( 19 Feb 2023 07:14 )             36.3     02-19    135  |  101  |  16  ----------------------------<  115<H>  4.0   |  31  |  0.97    Ca    8.5      19 Feb 2023 07:14    TPro  6.8  /  Alb  2.3<L>  /  TBili  0.4  /  DBili  x   /  AST  72<H>  /  ALT  220<H>  /  AlkPhos  36<L>  02-19     LIVER FUNCTIONS - ( 19 Feb 2023 07:14 )  Alb: 2.3 g/dL / Pro: 6.8 gm/dL / ALK PHOS: 36 U/L / ALT: 220 U/L / AST: 72 U/L / GGT: x               Radiology: all available radiological tests reviewed    < from: CT Lower Extremity w/ IV Cont, Left (02.18.23 @ 13:26) >  Large peripherally enhancing complex appearing fluid collection at the   lateral aspect of the vastus lateralis muscle at the level the proximal   third of the femur consistent with an intramuscular abscess. Adjacent   soft tissue swelling compatible with cellulitis.  < end of copied text >      Advanced directives addressed: full resuscitation

## 2023-02-20 LAB
24R-OH-CALCIDIOL SERPL-MCNC: 17.8 NG/ML — LOW (ref 30–80)
ALBUMIN SERPL ELPH-MCNC: 2.2 G/DL — LOW (ref 3.3–5)
ALP SERPL-CCNC: 40 U/L — SIGNIFICANT CHANGE UP (ref 40–120)
ALT FLD-CCNC: 151 U/L — HIGH (ref 12–78)
ANION GAP SERPL CALC-SCNC: 4 MMOL/L — LOW (ref 5–17)
AST SERPL-CCNC: 34 U/L — SIGNIFICANT CHANGE UP (ref 15–37)
BASOPHILS # BLD AUTO: 0.05 K/UL — SIGNIFICANT CHANGE UP (ref 0–0.2)
BASOPHILS NFR BLD AUTO: 0.4 % — SIGNIFICANT CHANGE UP (ref 0–2)
BILIRUB DIRECT SERPL-MCNC: 0.2 MG/DL — SIGNIFICANT CHANGE UP (ref 0–0.3)
BILIRUB INDIRECT FLD-MCNC: 0.4 MG/DL — SIGNIFICANT CHANGE UP (ref 0.2–1)
BILIRUB SERPL-MCNC: 0.6 MG/DL — SIGNIFICANT CHANGE UP (ref 0.2–1.2)
BUN SERPL-MCNC: 13 MG/DL — SIGNIFICANT CHANGE UP (ref 7–23)
CALCIUM SERPL-MCNC: 8.7 MG/DL — SIGNIFICANT CHANGE UP (ref 8.5–10.1)
CHLORIDE SERPL-SCNC: 99 MMOL/L — SIGNIFICANT CHANGE UP (ref 96–108)
CHOLEST SERPL-MCNC: 129 MG/DL — SIGNIFICANT CHANGE UP
CHOLEST SERPL-MCNC: 141 MG/DL — SIGNIFICANT CHANGE UP
CO2 SERPL-SCNC: 30 MMOL/L — SIGNIFICANT CHANGE UP (ref 22–31)
CORTIS AM PEAK SERPL-MCNC: 12.2 UG/DL — SIGNIFICANT CHANGE UP (ref 6–18.4)
CREAT SERPL-MCNC: 0.98 MG/DL — SIGNIFICANT CHANGE UP (ref 0.5–1.3)
CRP SERPL-MCNC: 175 MG/L — HIGH
EGFR: 106 ML/MIN/1.73M2 — SIGNIFICANT CHANGE UP
EOSINOPHIL # BLD AUTO: 0.06 K/UL — SIGNIFICANT CHANGE UP (ref 0–0.5)
EOSINOPHIL NFR BLD AUTO: 0.5 % — SIGNIFICANT CHANGE UP (ref 0–6)
FERRITIN SERPL-MCNC: 1351 NG/ML — HIGH (ref 30–400)
FOLATE SERPL-MCNC: 8.5 NG/ML — SIGNIFICANT CHANGE UP
GLUCOSE SERPL-MCNC: 143 MG/DL — HIGH (ref 70–99)
HAV IGM SER-ACNC: SIGNIFICANT CHANGE UP
HBV CORE IGM SER-ACNC: SIGNIFICANT CHANGE UP
HBV SURFACE AG SER-ACNC: SIGNIFICANT CHANGE UP
HCT VFR BLD CALC: 36 % — LOW (ref 39–50)
HCT VFR BLD CALC: 37.1 % — LOW (ref 39–50)
HCV AB S/CO SERPL IA: 0.53 S/CO — SIGNIFICANT CHANGE UP (ref 0–0.99)
HCV AB SERPL-IMP: SIGNIFICANT CHANGE UP
HDLC SERPL-MCNC: 14 MG/DL — LOW
HDLC SERPL-MCNC: 14 MG/DL — LOW
HGB BLD-MCNC: 12.3 G/DL — LOW (ref 13–17)
HGB BLD-MCNC: 12.8 G/DL — LOW (ref 13–17)
IMM GRANULOCYTES NFR BLD AUTO: 1.2 % — HIGH (ref 0–0.9)
INR BLD: 1.85 RATIO — HIGH (ref 0.88–1.16)
INR BLD: 1.99 RATIO — HIGH (ref 0.88–1.16)
IRON SATN MFR SERPL: 10 % — LOW (ref 16–55)
IRON SATN MFR SERPL: 19 UG/DL — LOW (ref 45–165)
LIPID PNL WITH DIRECT LDL SERPL: 106 MG/DL — HIGH
LIPID PNL WITH DIRECT LDL SERPL: 115 MG/DL — HIGH
LYMPHOCYTES # BLD AUTO: 1.14 K/UL — SIGNIFICANT CHANGE UP (ref 1–3.3)
LYMPHOCYTES # BLD AUTO: 8.8 % — LOW (ref 13–44)
MCHC RBC-ENTMCNC: 28.2 PG — SIGNIFICANT CHANGE UP (ref 27–34)
MCHC RBC-ENTMCNC: 34.2 GM/DL — SIGNIFICANT CHANGE UP (ref 32–36)
MCV RBC AUTO: 82.6 FL — SIGNIFICANT CHANGE UP (ref 80–100)
MONOCYTES # BLD AUTO: 1 K/UL — HIGH (ref 0–0.9)
MONOCYTES NFR BLD AUTO: 7.7 % — SIGNIFICANT CHANGE UP (ref 2–14)
NEUTROPHILS # BLD AUTO: 10.56 K/UL — HIGH (ref 1.8–7.4)
NEUTROPHILS NFR BLD AUTO: 81.4 % — HIGH (ref 43–77)
NON HDL CHOLESTEROL: 115 MG/DL — SIGNIFICANT CHANGE UP
NON HDL CHOLESTEROL: 127 MG/DL — SIGNIFICANT CHANGE UP
NT-PROBNP SERPL-SCNC: 203 PG/ML — HIGH (ref 0–125)
PLATELET # BLD AUTO: 355 K/UL — SIGNIFICANT CHANGE UP (ref 150–400)
POTASSIUM SERPL-MCNC: 3.8 MMOL/L — SIGNIFICANT CHANGE UP (ref 3.5–5.3)
POTASSIUM SERPL-SCNC: 3.8 MMOL/L — SIGNIFICANT CHANGE UP (ref 3.5–5.3)
PROT SERPL-MCNC: 6.6 GM/DL — SIGNIFICANT CHANGE UP (ref 6–8.3)
PROTHROM AB SERPL-ACNC: 21.6 SEC — HIGH (ref 10.5–13.4)
PROTHROM AB SERPL-ACNC: 23.2 SEC — HIGH (ref 10.5–13.4)
RBC # BLD: 4.36 M/UL — SIGNIFICANT CHANGE UP (ref 4.2–5.8)
RBC # FLD: 14.2 % — SIGNIFICANT CHANGE UP (ref 10.3–14.5)
SODIUM SERPL-SCNC: 133 MMOL/L — LOW (ref 135–145)
TIBC SERPL-MCNC: 192 UG/DL — LOW (ref 220–430)
TRIGL SERPL-MCNC: 44 MG/DL — SIGNIFICANT CHANGE UP
TRIGL SERPL-MCNC: 57 MG/DL — SIGNIFICANT CHANGE UP
TROPONIN I, HIGH SENSITIVITY RESULT: 40.67 NG/L — SIGNIFICANT CHANGE UP
UIBC SERPL-MCNC: 173 UG/DL — SIGNIFICANT CHANGE UP (ref 110–370)
VIT B12 SERPL-MCNC: 1515 PG/ML — HIGH (ref 232–1245)
WBC # BLD: 12.97 K/UL — HIGH (ref 3.8–10.5)
WBC # FLD AUTO: 12.97 K/UL — HIGH (ref 3.8–10.5)

## 2023-02-20 PROCEDURE — 76700 US EXAM ABDOM COMPLETE: CPT | Mod: 26

## 2023-02-20 PROCEDURE — 93010 ELECTROCARDIOGRAM REPORT: CPT

## 2023-02-20 PROCEDURE — 99232 SBSQ HOSP IP/OBS MODERATE 35: CPT

## 2023-02-20 RX ORDER — PHYTONADIONE (VIT K1) 5 MG
10 TABLET ORAL ONCE
Refills: 0 | Status: COMPLETED | OUTPATIENT
Start: 2023-02-20 | End: 2023-02-20

## 2023-02-20 RX ORDER — CHOLECALCIFEROL (VITAMIN D3) 125 MCG
2000 CAPSULE ORAL AT BEDTIME
Refills: 0 | Status: DISCONTINUED | OUTPATIENT
Start: 2023-02-20 | End: 2023-02-24

## 2023-02-20 RX ORDER — OXYCODONE HYDROCHLORIDE 5 MG/1
10 TABLET ORAL EVERY 4 HOURS
Refills: 0 | Status: DISCONTINUED | OUTPATIENT
Start: 2023-02-20 | End: 2023-02-23

## 2023-02-20 RX ORDER — HYDROMORPHONE HYDROCHLORIDE 2 MG/ML
1 INJECTION INTRAMUSCULAR; INTRAVENOUS; SUBCUTANEOUS ONCE
Refills: 0 | Status: DISCONTINUED | OUTPATIENT
Start: 2023-02-20 | End: 2023-02-20

## 2023-02-20 RX ORDER — PHYTONADIONE (VIT K1) 5 MG
5 TABLET ORAL
Refills: 0 | Status: DISCONTINUED | OUTPATIENT
Start: 2023-02-20 | End: 2023-02-20

## 2023-02-20 RX ORDER — ENOXAPARIN SODIUM 100 MG/ML
40 INJECTION SUBCUTANEOUS EVERY 24 HOURS
Refills: 0 | Status: DISCONTINUED | OUTPATIENT
Start: 2023-02-20 | End: 2023-02-23

## 2023-02-20 RX ORDER — FAMOTIDINE 10 MG/ML
20 INJECTION INTRAVENOUS ONCE
Refills: 0 | Status: COMPLETED | OUTPATIENT
Start: 2023-02-20 | End: 2023-02-20

## 2023-02-20 RX ADMIN — Medication 650 MILLIGRAM(S): at 04:54

## 2023-02-20 RX ADMIN — OXYCODONE HYDROCHLORIDE 10 MILLIGRAM(S): 5 TABLET ORAL at 17:30

## 2023-02-20 RX ADMIN — Medication 650 MILLIGRAM(S): at 16:59

## 2023-02-20 RX ADMIN — HYDROMORPHONE HYDROCHLORIDE 1 MILLIGRAM(S): 2 INJECTION INTRAMUSCULAR; INTRAVENOUS; SUBCUTANEOUS at 12:27

## 2023-02-20 RX ADMIN — FAMOTIDINE 20 MILLIGRAM(S): 10 INJECTION INTRAVENOUS at 03:06

## 2023-02-20 RX ADMIN — Medication 300 MILLIGRAM(S): at 05:49

## 2023-02-20 RX ADMIN — SODIUM CHLORIDE 100 MILLILITER(S): 9 INJECTION, SOLUTION INTRAVENOUS at 15:13

## 2023-02-20 RX ADMIN — Medication 650 MILLIGRAM(S): at 16:29

## 2023-02-20 RX ADMIN — Medication 300 MILLIGRAM(S): at 17:30

## 2023-02-20 RX ADMIN — OXYCODONE HYDROCHLORIDE 10 MILLIGRAM(S): 5 TABLET ORAL at 18:00

## 2023-02-20 RX ADMIN — SODIUM CHLORIDE 100 MILLILITER(S): 9 INJECTION, SOLUTION INTRAVENOUS at 03:06

## 2023-02-20 RX ADMIN — Medication 10 MILLIGRAM(S): at 13:36

## 2023-02-20 RX ADMIN — Medication 2000 UNIT(S): at 21:48

## 2023-02-20 RX ADMIN — HYDROMORPHONE HYDROCHLORIDE 1 MILLIGRAM(S): 2 INJECTION INTRAMUSCULAR; INTRAVENOUS; SUBCUTANEOUS at 12:57

## 2023-02-20 RX ADMIN — HYDROMORPHONE HYDROCHLORIDE 1 MILLIGRAM(S): 2 INJECTION INTRAMUSCULAR; INTRAVENOUS; SUBCUTANEOUS at 15:22

## 2023-02-20 RX ADMIN — HYDROMORPHONE HYDROCHLORIDE 1 MILLIGRAM(S): 2 INJECTION INTRAMUSCULAR; INTRAVENOUS; SUBCUTANEOUS at 14:52

## 2023-02-20 RX ADMIN — Medication 650 MILLIGRAM(S): at 05:45

## 2023-02-20 RX ADMIN — HYDROMORPHONE HYDROCHLORIDE 1 MILLIGRAM(S): 2 INJECTION INTRAMUSCULAR; INTRAVENOUS; SUBCUTANEOUS at 22:18

## 2023-02-20 RX ADMIN — CEFEPIME 100 MILLIGRAM(S): 1 INJECTION, POWDER, FOR SOLUTION INTRAMUSCULAR; INTRAVENOUS at 05:00

## 2023-02-20 RX ADMIN — CEFEPIME 100 MILLIGRAM(S): 1 INJECTION, POWDER, FOR SOLUTION INTRAMUSCULAR; INTRAVENOUS at 16:26

## 2023-02-20 RX ADMIN — HYDROMORPHONE HYDROCHLORIDE 1 MILLIGRAM(S): 2 INJECTION INTRAMUSCULAR; INTRAVENOUS; SUBCUTANEOUS at 22:30

## 2023-02-20 NOTE — PROCEDURAL SAFETY CHECKLIST WITH OR WITHOUT SEDATION - NSPRESURGSED_GEN_ALL_CORE
Patient was treated for BV 3/30/17 with Flagyl for one week.  The fishy odor went away but came back about a week later and now has been present for about 2 weeks.  She doesn't have any discharge but has a little itchiness.  Will discuss with Dr. Calero and call patient back.   Present, accurate, and signed

## 2023-02-20 NOTE — PROVIDER CONTACT NOTE (OTHER) - ACTION/TREATMENT ORDERED:
Patient NPO at midnight tonight
Message left with answering service- #[phone]- Hematology consult as ordered

## 2023-02-20 NOTE — PROGRESS NOTE ADULT - ASSESSMENT
33 y/o male   on multiple supplements ( Creatinine, glutathione,  whey protein, collagen, apple cider vinegar, injections of B12, B6 vitamins)   otherwise healthy who presented today with left thigh abscess x 2 weeks. Pt reports subjective fevers for the past 2 days. Pt was seen last week at , was initially treated with Clinda x 3 days, though without improvement.    Assessment/Plan:    Left thigh pain , Fever due to Sepsis, POA , LLE cellulitis with intramuscular abscess  - as per surgery   - c/w IV abx cefepime and vanco   - f/u cultures  - c/w IV hydration, antipyretics   - plan for IR drainage adalid     Transaminitis resolving  likely adverse drug effect  multiple supplements   Hepatomegaly  Elevated B12 - advised to stop  -  lipid profile, lipase, amylase, hepatitis acute panel neg  , TSH wnl   - US abd - hepatomegaly   - on multiple supplements ( Creatinine, glutathione,  whey protein, collagen, apple cider vinegar, injections of B12, B6 vitamins)    - high vitamin B12, low vit  D -replace   - B6,  A1C   - pending     Chest pain likely due to GERD   - maalox prn  - ekg reviewed - some T wave inversion, check troponin, BNP   - CXR done - clear , official reading pending     Coagulopathy due to sepsis and liver dysfunction  - s/p IV vitamin K   - advised to give 10 mg po now, it may not help because cause of coagulopathy likely sepsis  - use FPP if needed  prior procedures     Hyperglycemia - check  A1C     Vitamin D deficiency - replace     DVT PPX: as per surgery    Disposition: as per surgery     Time Span: 75 min     Thank you for consult, will follow with you.

## 2023-02-20 NOTE — PROGRESS NOTE ADULT - SUBJECTIVE AND OBJECTIVE BOX
Subjective:  Chief complain :  Left thigh pain     HPI:  31 y/o male otherwise healthy who presented today with left thigh abscess x 2 weeks. Pt reports subjective fevers for the past 2 days. Pt was seen last week at , was initially treated with Clinda x 3 days, though without improvement. He saw UC again and was told to stop taking antibiotic because it was likely a muscle strain. Pt has since noted increased firmness, no spontaneous drainage or drainage procedures. He reports a Vitamin B12 injection to the left thigh 3 weeks prior. Pt endorses taking Ibuprofen without improvement.        -  Patient seen and examined at bedside earlier today, febrile, pain in the leg persist, percocet 5 mg not effective, events noted overnight, chest tightness, denies cough, cp, dyspnea now, denies abdominal pain, tolerating po diet , plan discussed     Review of system- Rest of the review of system are negative except mentioned in HPI     Vital sings reviewed for last 24 h  T(C): 36.9 (23 @ 09:33), Max: 39.3 (23 @ 16:20)  T(F): 98.5 (23 @ 09:33), Max: 102.8 (23 @ 16:20)  HR: 72 (23 @ 09:33) (72 - 101)  BP: 113/54 (23 @ 09:33) (111/57 - 134/59)  RR: 20 (23 @ 09:33) (18 - 20)  SpO2: 96% (23 @ 09:33) (96% - 99%)  Wt(kg): --  Daily     Daily   CAPILLARY BLOOD GLUCOSE      Physical exam:   General : NAD, appear to be of stated age , well groomed   NERVOUS SYSTEM:  Alert & Oriented X3, non- focal exam, Motor Strength 5/5 B/L upper and lower extremities; DTRs 2+ intact and symmetric  HEAD:  Atraumatic, Normocephalic  EYES: EOMI, PERRLA, conjunctiva and sclera clear  HEENT: Moist mucous membranes, Supple neck , No JVD  CHEST: Clear to auscultation bilaterally; No rales, no rhonchi, no wheezing  HEART: Regular rate and rhythm; No murmurs, no rubs or gallops  ABDOMEN: Soft, Non-tender, Non-distended; Bowel sounds present, no guarding , no peritoneal irritation   GENITOURINARY- Voiding, no suprapubic tenderness  EXTREMITIES:  2+ Peripheral Pulses, No clubbing, cyanosis,   edema  MUSCULOSKELETAL:- No muscle tenderness, Muscle tone normal, No joint tenderness, no Joint swelling,  Joint ROM –normal   SKIN-no rash, no lesion    Labs radiologic and other test : all reviewed and interpret       133<L>  |  99  |  13  ----------------------------<  143<H>  3.8   |  30  |  0.98    Ca    8.7      2023 07:14    TPro  6.6  /  Alb  2.2<L>  /  TBili  0.6  /  DBili  0.2  /  AST  34  /  ALT  151<H>  /  AlkPhos  40                              12.3   12.97 )-----------( 355      ( 2023 07:14 )             36.0       CARDIAC MARKERS ( 2023 20:26 )  x     / x     / 113 U/L / x     / x            LIVER FUNCTIONS - ( 2023 07:14 )  Alb: 2.2 g/dL / Pro: 6.6 gm/dL / ALK PHOS: 40 U/L / ALT: 151 U/L / AST: 34 U/L / GGT: x             PT/INR - ( 2023 07:14 )   PT: 21.6 sec;   INR: 1.85 ratio       C-Reactive Protein, Serum (23 @ 20:26)    C-Reactive Protein, Serum: 175 mg/L    PT/INR - ( 2023 07:14 )   PT: 21.6 sec;   INR: 1.85 ratio               Urinalysis Basic - ( 2023 23:27 )    Color: Yellow / Appearance: Clear / S.015 / pH: x  Gluc: x / Ketone: Negative  / Bili: Negative / Urobili: 8   Blood: x / Protein: Negative / Nitrite: Negative   Leuk Esterase: Negative / RBC: x / WBC x   Sq Epi: x / Non Sq Epi: x / Bacteria: x    Acute Hepatitis Panel (23 @ 20:26)    Hepatitis C Virus Interpretation: Nonreact: Hepatitis C AB  S/CO Ratio                        Interpretation  < 1.00                                   Non-Reactive  1.00 - 4.99                         Weakly-Reactive  >= 5.00                                Reactive  Non-Reactive: A person witha non-reactive HCV antibody result is  considered uninfected.  No further action is needed unless recent  infection is suspected.  In these cases, consider repeat testing later to  detect seroconversion..  Weakly-Reactive: HCV antibody test is abnormal, HCV RNA Qualitative test  will follow.  Reactive: HCV antibody test is abnormal, HCV RNA Qualitative test will  follow.  Note: HCV antibody testing is performed on the Abbott  system.    Hepatitis C Virus S/CO Ratio: 0.53 S/CO    Hepatitis B Core IgM Antibody: Nonreact    Hepatitis B Surface Antigen: Nonreact    Hepatitis A IgM Antibody: Nonreact  Ferritin, Serum (23 @ 20:26)    Ferritin, Serum: 1351 ng/mL    Cortisol AM, Serum . (23 @ 07:14)    Cortisol AM, Serum: 12.2 ug/dL        Iron with Total Binding Capacity (23 @ 20:26)    Iron - Total Binding Capacity.: 192 ug/dL    % Saturation, Iron: 10 %    Iron Total, Serum: 19 ug/dL    Unsaturated Iron Binding Capacity: 173 ug/dL    Vitamin B12, Serum (23 @ 20:26)    Vitamin B12, Serum: 1515 pg/mL      Vitamin D, 25-Hydroxy (23 @ 20:26)    Vitamin D, 25-Hydroxy: 17.8:     RECENT CULTURES:  Culture - Blood (23 @ 12:36)    Specimen Source: .Blood None    Culture Results:   No growth to date.    Culture - Blood (23 @ 12:36)    Specimen Source: .Blood None    Culture Results:   No growth to date.        Cardiac testing : reviewed   EKG   12 Lead ECG (23 @ 11:53) >  Ventricular Rate 76 BPM    QTC Calculation(Bazett) 402 ms    Diagnosis Line Normal sinus rhythm  Nonspecific T wave abnormality  No previous ECGs available     US Abdomen Complete (US Abdomen Complete .) (23 @ 07:52) >  FINDINGS:  Liver: Enlarged, measuring 21 cm in length..  Bile ducts: Normal caliber. Common bile duct measures 2 mm.  Gallbladder: Within normal limits.  Pancreas: Visualized portions are within normal limits.  Spleen: 12.7 cm. Within normal limits.  Right kidney: 12.4 cm. No hydronephrosis. Echogenic kidney. Trace   perinephric fluid.  Left kidney: 13.8 cm. No hydronephrosis. Echogenic kidney.  Aorta and IVC: Visualized portions are within normal limits.    IMPRESSION:  Hepatomegaly.  Echogenic kidneys which can be seen with medical renal disease. Trace   right perinephric fluid.      CT Lower Extremity w/ IV Cont, Left (23 @ 13:26) >  Large peripherally enhancing complex appearing fluid collection at the   lateral aspect of the vastus lateralis muscle at the level the proximal   third of the femur consistent with an intramuscular abscess. Adjacent   soft tissue swelling compatible with cellulitis.        Procedures :     Devices:     Current medications:  acetaminophen     Tablet .. 650 milliGRAM(s) Oral every 6 hours PRN  aluminum hydroxide/magnesium hydroxide/simethicone Suspension 30 milliLiter(s) Oral every 4 hours PRN  bisacodyl 5 milliGRAM(s) Oral daily PRN  bisacodyl Suppository 10 milliGRAM(s) Rectal once PRN  calcium carbonate    500 mG (Tums) Chewable 3 Tablet(s) Chew every 6 hours PRN  cefepime   IVPB      cefepime   IVPB 2000 milliGRAM(s) IV Intermittent every 12 hours  cholecalciferol 2000 Unit(s) Oral at bedtime  HYDROmorphone  Injectable 1 milliGRAM(s) IV Push every 4 hours PRN  lactated ringers. 1000 milliLiter(s) IV Continuous <Continuous>  metoclopramide Injectable 10 milliGRAM(s) IV Push every 6 hours PRN  ondansetron Injectable 4 milliGRAM(s) IV Push every 6 hours PRN  oxycodone    5 mG/acetaminophen 325 mG 1 Tablet(s) Oral every 4 hours PRN  vancomycin  IVPB 1500 milliGRAM(s) IV Intermittent every 12 hours

## 2023-02-21 LAB
A1C WITH ESTIMATED AVERAGE GLUCOSE RESULT: 5.7 % — HIGH (ref 4–5.6)
ALBUMIN SERPL ELPH-MCNC: 2.2 G/DL — LOW (ref 3.3–5)
ALP SERPL-CCNC: 37 U/L — LOW (ref 40–120)
ALT FLD-CCNC: 114 U/L — HIGH (ref 12–78)
ANION GAP SERPL CALC-SCNC: 4 MMOL/L — LOW (ref 5–17)
AST SERPL-CCNC: 27 U/L — SIGNIFICANT CHANGE UP (ref 15–37)
BASOPHILS # BLD AUTO: 0.03 K/UL — SIGNIFICANT CHANGE UP (ref 0–0.2)
BASOPHILS NFR BLD AUTO: 0.3 % — SIGNIFICANT CHANGE UP (ref 0–2)
BILIRUB SERPL-MCNC: 0.5 MG/DL — SIGNIFICANT CHANGE UP (ref 0.2–1.2)
BUN SERPL-MCNC: 15 MG/DL — SIGNIFICANT CHANGE UP (ref 7–23)
CALCIUM SERPL-MCNC: 8.4 MG/DL — LOW (ref 8.5–10.1)
CHLORIDE SERPL-SCNC: 98 MMOL/L — SIGNIFICANT CHANGE UP (ref 96–108)
CO2 SERPL-SCNC: 31 MMOL/L — SIGNIFICANT CHANGE UP (ref 22–31)
CREAT SERPL-MCNC: 0.89 MG/DL — SIGNIFICANT CHANGE UP (ref 0.5–1.3)
CRP SERPL-MCNC: 176 MG/L — HIGH
D DIMER BLD IA.RAPID-MCNC: 1200 NG/ML DDU — HIGH
EGFR: 118 ML/MIN/1.73M2 — SIGNIFICANT CHANGE UP
EOSINOPHIL # BLD AUTO: 0.13 K/UL — SIGNIFICANT CHANGE UP (ref 0–0.5)
EOSINOPHIL NFR BLD AUTO: 1.1 % — SIGNIFICANT CHANGE UP (ref 0–6)
ESTIMATED AVERAGE GLUCOSE: 117 MG/DL — HIGH (ref 68–114)
FIBRINOGEN PPP-MCNC: 1335 MG/DL — HIGH (ref 330–520)
GLUCOSE SERPL-MCNC: 112 MG/DL — HIGH (ref 70–99)
HCT VFR BLD CALC: 32.6 % — LOW (ref 39–50)
HGB BLD-MCNC: 11.2 G/DL — LOW (ref 13–17)
IMM GRANULOCYTES NFR BLD AUTO: 1.5 % — HIGH (ref 0–0.9)
INR BLD: 1.88 RATIO — HIGH (ref 0.88–1.16)
LYMPHOCYTES # BLD AUTO: 1.03 K/UL — SIGNIFICANT CHANGE UP (ref 1–3.3)
LYMPHOCYTES # BLD AUTO: 8.8 % — LOW (ref 13–44)
MAGNESIUM SERPL-MCNC: 2 MG/DL — SIGNIFICANT CHANGE UP (ref 1.6–2.6)
MCHC RBC-ENTMCNC: 28 PG — SIGNIFICANT CHANGE UP (ref 27–34)
MCHC RBC-ENTMCNC: 34.4 GM/DL — SIGNIFICANT CHANGE UP (ref 32–36)
MCV RBC AUTO: 81.5 FL — SIGNIFICANT CHANGE UP (ref 80–100)
MONOCYTES # BLD AUTO: 0.92 K/UL — HIGH (ref 0–0.9)
MONOCYTES NFR BLD AUTO: 7.8 % — SIGNIFICANT CHANGE UP (ref 2–14)
NEUTROPHILS # BLD AUTO: 9.45 K/UL — HIGH (ref 1.8–7.4)
NEUTROPHILS NFR BLD AUTO: 80.5 % — HIGH (ref 43–77)
PHOSPHATE SERPL-MCNC: 3.7 MG/DL — SIGNIFICANT CHANGE UP (ref 2.5–4.5)
PLATELET # BLD AUTO: 384 K/UL — SIGNIFICANT CHANGE UP (ref 150–400)
POTASSIUM SERPL-MCNC: 3.8 MMOL/L — SIGNIFICANT CHANGE UP (ref 3.5–5.3)
POTASSIUM SERPL-SCNC: 3.8 MMOL/L — SIGNIFICANT CHANGE UP (ref 3.5–5.3)
PROT SERPL-MCNC: 6.5 GM/DL — SIGNIFICANT CHANGE UP (ref 6–8.3)
PROTHROM AB SERPL-ACNC: 21.9 SEC — HIGH (ref 10.5–13.4)
RBC # BLD: 4 M/UL — LOW (ref 4.2–5.8)
RBC # FLD: 13.9 % — SIGNIFICANT CHANGE UP (ref 10.3–14.5)
SODIUM SERPL-SCNC: 133 MMOL/L — LOW (ref 135–145)
VANCOMYCIN TROUGH SERPL-MCNC: 2 UG/ML — LOW (ref 10–20)
WBC # BLD: 11.74 K/UL — HIGH (ref 3.8–10.5)
WBC # FLD AUTO: 11.74 K/UL — HIGH (ref 3.8–10.5)

## 2023-02-21 PROCEDURE — 99223 1ST HOSP IP/OBS HIGH 75: CPT

## 2023-02-21 PROCEDURE — 99232 SBSQ HOSP IP/OBS MODERATE 35: CPT

## 2023-02-21 PROCEDURE — 93970 EXTREMITY STUDY: CPT | Mod: 26

## 2023-02-21 RX ORDER — VANCOMYCIN HCL 1 G
1500 VIAL (EA) INTRAVENOUS EVERY 8 HOURS
Refills: 0 | Status: DISCONTINUED | OUTPATIENT
Start: 2023-02-21 | End: 2023-02-23

## 2023-02-21 RX ADMIN — Medication 300 MILLIGRAM(S): at 06:10

## 2023-02-21 RX ADMIN — OXYCODONE HYDROCHLORIDE 10 MILLIGRAM(S): 5 TABLET ORAL at 02:15

## 2023-02-21 RX ADMIN — SODIUM CHLORIDE 100 MILLILITER(S): 9 INJECTION, SOLUTION INTRAVENOUS at 10:45

## 2023-02-21 RX ADMIN — CEFEPIME 100 MILLIGRAM(S): 1 INJECTION, POWDER, FOR SOLUTION INTRAMUSCULAR; INTRAVENOUS at 16:13

## 2023-02-21 RX ADMIN — OXYCODONE HYDROCHLORIDE 10 MILLIGRAM(S): 5 TABLET ORAL at 15:27

## 2023-02-21 RX ADMIN — Medication 300 MILLIGRAM(S): at 21:05

## 2023-02-21 RX ADMIN — OXYCODONE HYDROCHLORIDE 10 MILLIGRAM(S): 5 TABLET ORAL at 15:57

## 2023-02-21 RX ADMIN — OXYCODONE HYDROCHLORIDE 10 MILLIGRAM(S): 5 TABLET ORAL at 01:40

## 2023-02-21 RX ADMIN — OXYCODONE HYDROCHLORIDE 10 MILLIGRAM(S): 5 TABLET ORAL at 09:15

## 2023-02-21 RX ADMIN — ENOXAPARIN SODIUM 40 MILLIGRAM(S): 100 INJECTION SUBCUTANEOUS at 11:41

## 2023-02-21 RX ADMIN — CEFEPIME 100 MILLIGRAM(S): 1 INJECTION, POWDER, FOR SOLUTION INTRAMUSCULAR; INTRAVENOUS at 03:50

## 2023-02-21 RX ADMIN — OXYCODONE HYDROCHLORIDE 10 MILLIGRAM(S): 5 TABLET ORAL at 08:45

## 2023-02-21 RX ADMIN — Medication 300 MILLIGRAM(S): at 13:45

## 2023-02-21 RX ADMIN — Medication 2000 UNIT(S): at 21:04

## 2023-02-21 NOTE — PROGRESS NOTE ADULT - ASSESSMENT
HPI: 32M with no significant PMHx presented to [facility] on 02/18/2023 with left thigh abscess x2 weeks. Pt. reports subjective fevers for the past 2 days. Pt. was seen last week at urgent care and was initially treated with Clinda x3 days, though without improvement. He saw urgent care again and was told to stop taking antibiotic because it was likely a muscle strain. Pt. has since noted increased firmness, no spontaneous drainage, or drainage procedures. He reports a Vitamin B12 injection to the left thigh 3 weeks prior. Pt endorses taking Ibuprofen without improvement.    Left thigh pain, Fever due to Sepsis, POA, LLE cellulitis with intramuscular abscess  - As per surgery   - C/W IV abx - cefepime and vanco   - F/U cultures  - C/W IV hydration, antipyretics   - 2/20 drainage of left thigh abscess, 350cc drained during procedure    Transaminitis resolving, likely adverse drug effect, multiple supplements   Hepatomegaly  Elevated B12 - advised to stop  - Lipid profile, lipase, amylase, hepatitis acute panel neg, TSH WDL  - US abdomen demonstrating hepatomegaly  - On multiple supplements - creatinine, glutathione, L-arginine, whey protein, collagen, apple cider vinegar, injections of B12, B6 vitamins  - High vitamin B12, low vit-D; will order vit-d supplement  - A1C 5.7  - B6 - pending    Chest pain likely due to GERD   - Maalox and TUMS PRN  - EKG reviewed - some T wave inversion  - Troponin negative,   - CXR - clear, no acute findings    Coagulopathy due to sepsis and liver dysfunction, ?DIC due to sepsis  - S/P IV vitamin K   - Advised to give 10 mg PO now, it may not help because cause of coagulopathy likely due to sepsis  - Use FFP if needed  prior procedures  - As per hematology, monitor DIC labs - Fibrinogen 1335, D-dimer 1200, PT 21.9, INR 1.88    Hyperglycemia  - A1C 5.7    Vitamin D deficiency  - Will replace     DVT PPX:  - As per surgery    Disposition: as per surgery    Time Span: 75 min     Thank you for consult, will follow with you.    HPI: 32M with no significant PMHx presented to [facility] on 02/18/2023 with left thigh abscess x2 weeks. Pt. reports subjective fevers for the past 2 days. Pt. was seen last week at urgent care and was initially treated with Clinda x3 days, though without improvement. He saw urgent care again and was told to stop taking antibiotic because it was likely a muscle strain. Pt. has since noted increased firmness, no spontaneous drainage, or drainage procedures. He reports a Vitamin B12 injection to the left thigh 3 weeks prior. Pt endorses taking Ibuprofen without improvement.     Sepsis, POA, due to LLE cellulitis with intramuscular abscess s/p drain placement on 2/20   - As per surgery   - C/W IV abx - cefepime and vanco   - F/U cultures  - C/W IV hydration, antipyretics   - 2/20 drainage of left thigh abscess, 350cc drained during procedure    Transaminitis resolving, likely adverse drug effect, multiple supplements   Hepatomegaly  Elevated B12 - advised to stop  - Lipid profile, lipase, amylase, hepatitis acute panel neg, TSH WDL  - US abdomen demonstrating hepatomegaly  - On multiple supplements - creatinine, glutathione, L-arginine, whey protein, collagen, apple cider vinegar, injections of B12, B6 vitamins  - High vitamin B12, low vit-D; will order vit-d supplement  - A1C 5.7  - B6 - pending  - GI consult Dr. Jain - no further work-up needed , o/p follow up , hepatomegaly mild    Coagulopathy due to sepsis and liver dysfunction, ?DIC due to sepsis  Elevated d-dimers s/p I&D can be falsely elevated due to surgical intervention and sepsis  - S/P IV vitamin K , s/p 10 mg vit K  PO now, it may not help because cause of coagulopathy likely due to sepsis  - As per hematology, monitor DIC labs - Fibrinogen 1335, D-dimer 1200, PT 21.9, INR 1.88  - doppler LE - no DVT  - low suspicion for PE  - no dyspnea, , no hypoxia, no chest pain    Chest pain  resolved, due to GERD   - Maalox and TUMS PRN  - EKG reviewed - some T wave inversion  - Troponin negative,   - CXR - clear, no acute findings      Hyperglycemia  - A1C 5.7    Vitamin D deficiency  - Will replace     DVT PPX:  - As per surgery    Disposition: as per surgery    Time Span: 75 min     Thank you for consult, will follow with you.

## 2023-02-21 NOTE — PROGRESS NOTE ADULT - SUBJECTIVE AND OBJECTIVE BOX
Still with pain down left thigh, leg  VSS Tm 100  Left thigh drain- seropurulent.  edema of LE from groin down to foot.    Dopplers- no DVT

## 2023-02-21 NOTE — CONSULT NOTE ADULT - SUBJECTIVE AND OBJECTIVE BOX
HPI:  33 y/o male otherwise healthy who presented today with left thigh abscess x 2 weeks. Pt reports subjective fevers for the past 2 days. Pt was seen last week at , was initially treated with Clinda x 3 days, though without improvement. He saw  again and was told to stop taking antibiotic because it was likely a muscle strain. Pt has since noted increased firmness, no spontaneous drainage or drainage procedures. He reports a Vitamin B12 injection to the left thigh 3 weeks prior. Pt endorses taking Ibuprofen without improvement.       (18 Feb 2023 18:09)      PAST MEDICAL & SURGICAL HISTORY:  No significant past surgical history          MEDICATIONS  (STANDING):  cefepime   IVPB      cefepime   IVPB 2000 milliGRAM(s) IV Intermittent every 12 hours  cholecalciferol 2000 Unit(s) Oral at bedtime  enoxaparin Injectable 40 milliGRAM(s) SubCutaneous every 24 hours  lactated ringers. 1000 milliLiter(s) (100 mL/Hr) IV Continuous <Continuous>  vancomycin  IVPB 1500 milliGRAM(s) IV Intermittent every 8 hours    MEDICATIONS  (PRN):  acetaminophen     Tablet .. 650 milliGRAM(s) Oral every 6 hours PRN Temp greater or equal to 38C (100.4F), Mild Pain (1 - 3)  aluminum hydroxide/magnesium hydroxide/simethicone Suspension 30 milliLiter(s) Oral every 4 hours PRN Dyspepsia  bisacodyl 5 milliGRAM(s) Oral daily PRN Constipation  bisacodyl Suppository 10 milliGRAM(s) Rectal once PRN Constipation  calcium carbonate    500 mG (Tums) Chewable 3 Tablet(s) Chew every 6 hours PRN Dyspepsia  HYDROmorphone  Injectable 1 milliGRAM(s) IV Push every 4 hours PRN Severe Pain (7 - 10)  metoclopramide Injectable 10 milliGRAM(s) IV Push every 6 hours PRN Nausea and/or Vomiting not relieved by Zofran  ondansetron Injectable 4 milliGRAM(s) IV Push every 6 hours PRN Nausea  oxyCODONE    IR 10 milliGRAM(s) Oral every 4 hours PRN Moderate Pain (4 - 6)      Allergies    penicillin (Rash (Mild))    Intolerances        SOCIAL HISTORY:    FAMILY HISTORY:   Non-contributory    REVIEW OF SYSTEMS      General:	    Respiratory and Thorax:  	  Cardiovascular:	    Gastrointestinal:	    Musculoskeletal:	   Vital Signs Last 24 Hrs  T(C): 37.2 (21 Feb 2023 15:00), Max: 38.6 (20 Feb 2023 16:29)  T(F): 99 (21 Feb 2023 15:00), Max: 101.4 (20 Feb 2023 16:29)  HR: 71 (21 Feb 2023 15:00) (71 - 96)  BP: 124/54 (21 Feb 2023 15:00) (113/52 - 153/70)  BP(mean): 65 (21 Feb 2023 06:18) (65 - 65)  RR: 18 (21 Feb 2023 15:00) (16 - 18)  SpO2: 98% (21 Feb 2023 15:00) (96% - 99%)    Parameters below as of 21 Feb 2023 15:00  Patient On (Oxygen Delivery Method): room air        HEENT :No Pallor.No icterus. EOMI,PERLAA  Chest : Clear to Auscultation  CVS : S1S2 Normal.No murmurs.  Abdomen: Soft.Non tender .Normal bowel sounds.No Organomegaly.  CNS: Alert.Oriented to Time,Place and Person.No focal deficit.      LABS:                        11.2   11.74 )-----------( 384      ( 21 Feb 2023 05:58 )             32.6     02-21    133<L>  |  98  |  15  ----------------------------<  112<H>  3.8   |  31  |  0.89    Ca    8.4<L>      21 Feb 2023 05:58  Phos  3.7     02-21  Mg     2.0     02-21    TPro  6.5  /  Alb  2.2<L>  /  TBili  0.5  /  DBili  x   /  AST  27  /  ALT  114<H>  /  AlkPhos  37<L>  02-21    PT/INR - ( 21 Feb 2023 05:58 )   PT: 21.9 sec;   INR: 1.88 ratio           LIVER FUNCTIONS - ( 21 Feb 2023 05:58 )  Alb: 2.2 g/dL / Pro: 6.5 gm/dL / ALK PHOS: 37 U/L / ALT: 114 U/L / AST: 27 U/L / GGT: x             RADIOLOGY & ADDITIONAL STUDIES:

## 2023-02-21 NOTE — PHARMACOTHERAPY INTERVENTION NOTE - COMMENTS
Medication history complete. Medications and allergies reviewed with patient and confirmed with . Patient states that he is not currently taking prescription medications.
Modified penicillin allergy to state patient tolerated cefepime during this admission.    Tone Mancini, PharmD  Clinical Pharmacy Specialist, Infectious Diseases  Tele-Antim[facility] Stewardship Program (Tele-[facility])  Tele-[facility] Phone: [phone]

## 2023-02-21 NOTE — PROGRESS NOTE ADULT - ATTENDING COMMENTS
32M with no significant PMHx presented to [facility] on 02/18/2023 with left thigh abscess x2 weeks. Pt. reports subjective fevers for the past 2 days. Pt. was seen last week at urgent care and was initially treated with Clinda x3 days, though without improvement. He saw urgent care again and was told to stop taking antibiotic because it was likely a muscle strain. Pt. has since noted increased firmness, no spontaneous drainage, or drainage procedures. He reports a Vitamin B12 injection to the left thigh 3 weeks prior. Pt endorses taking Ibuprofen without improvement.    Hospital Course:  02/20 - drainage of left thigh abscess; 350cc drained during procedure    02/21 - Pt. seen and examined at bedside earlier today,  feels better, less pain in the leg, + febrile , tolerating IV abx    Vital Signs Last 24 Hrs  T(C): 37.2 (21 Feb 2023 15:00), Max: 38.6 (20 Feb 2023 16:29)  T(F): 99 (21 Feb 2023 15:00), Max: 101.4 (20 Feb 2023 16:29)  HR: 71 (21 Feb 2023 15:00) (71 - 96)  BP: 124/54 (21 Feb 2023 15:00) (113/52 - 153/70)  BP(mean): 65 (21 Feb 2023 06:18) (65 - 65)  RR: 18 (21 Feb 2023 15:00) (16 - 18)  SpO2: 98% (21 Feb 2023 15:00) (96% - 99%)    Physical exam:   General : NAD, appear to be of stated age, well groomed   NERVOUS SYSTEM:  A&Ox3, non-focal exam, motor Strength 5/5 bilateral upper and lower extremities; DTRs 2+ intact and symmetric  HEAD:  Atraumatic, Normocephalic  EYES: EOMI, PERRLA, conjunctiva and sclera clear  HEENT: Moist mucous membranes, supple neck, no JVD  CHEST: Clear to auscultation bilaterally; No rales, no rhonchi, no wheezing  HEART: Regular rate and rhythm; No murmurs, no rubs or gallops  ABDOMEN: Soft, non-tender, Non-distended; Bowel sounds present, no guarding , no peritoneal irritation   GENITOURINARY: Voiding, no suprapubic tenderness  EXTREMITIES:  2+ Peripheral Pulses, No clubbing, cyanosis, edema  MUSCULOSKELETAL: No muscle tenderness, muscle tone normal, no joint tenderness or swelling noted; FROM  SKIN: No rash, Left thigh drain in place with serosanguinous discharge     Labs radiologic and other test: all reviewed and interpret    A/P  1. Sepsis, POA, due to LLE cellulitis with intramuscular abscess s/p drain placement on 2/20   - As per surgery ,  C/W IV abx - cefepime and vanco ,  F/U cultures,  C/W IV hydration, antipyretics  s/p  2/20 drainage of left thigh abscess, 350cc drained during procedure    2. Transaminitis resolving, likely adverse drug effect, multiple supplements , Hepatomegaly, Elevated B12 - advised to stop  - Lipid profile, lipase, amylase, hepatitis acute panel neg, TSH WDL,  US abdomen demonstrating hepatomegaly  - On multiple supplements - creatinine, glutathione, L-arginine, whey protein, collagen, apple cider vinegar, injections of B12, B6 vitamins  - High vitamin B12, low vit-D; will order vit-d supplement,  B6 - pending  - GI consult Dr. Jain - no further work-up needed , o/p follow up , hepatomegaly mild    3. Coagulopathy due to sepsis and liver dysfunction, ?DIC due to sepsis, Elevated d-dimers s/p I&D can be falsely elevated due to surgical intervention and sepsis - - S/P IV vitamin K , s/p 10 mg vit K  PO now, it may not help because cause of coagulopathy likely due to sepsis  - As per hematology, monitor DIC labs - Fibrinogen 1335, D-dimer 1200, PT 21.9, INR 1.88,  doppler LE - no DVT,  low suspicion for PE  - no dyspnea, , no hypoxia, no chest pain    4. Chest pain  resolved, due to GERD  -  Maalox and TUMS PRN,  EKG reviewed - some T wave inversion,  Troponin negative, ,  CXR - clear, no acute findings    5. Hyperglycemia due to prediabetes -  A1C 5.7     6. Vitamin D deficiency - Will replace     Thank you for consult, will follow with you.

## 2023-02-21 NOTE — CHART NOTE - NSCHARTNOTEFT_GEN_A_CORE
Plt 384  PT 21.9  INR 1.88  DDimer 1200  Fibrinogen 1335    DDimer elevated & Fibrinogen is normal to low in DIC, but in acute state will be elevated; concern for non overt DIC based on these labs, will order bilateral lower extremity venous US/Doppler to r/o potential DVT

## 2023-02-21 NOTE — CONSULT NOTE ADULT - SUBJECTIVE AND OBJECTIVE BOX
HPI:    31 y/o M otherwise healthy who presented to the ED with L thigh abscess x2 weeks. He admitted that he initially felt pain with localized swelling which slowly progressed and then developed fever/chills. At that point he went to a local urgent care, given Clindamycin which he took for about 3 days, symptoms did not improve, went back to the urgent care, received a xray that did not show anything acute, advised that he likely had a muscle strain. He noted that the symptoms continued to worsen to the point that he came to the ED. He admitted that he is a competing  and takes various amounts of supplements currently including creatine, whey protein, arginine, Vit B6 and Vit B12 injections. He admitted that he has used exogenous testosterone in the past which he has injected into his shoulders/thighs/gluts, but has not used in about 1.5 - 2 years. He admitted the most recent Vit B12 injection was about 3-4 weeks ago before the L thigh symptoms started. He is otherwise well currently and denies any cough, HA, vision/hearing changes, palpitations, SOB, CANSECO, wheezing, abdominal pain, nausea, vomiting, diarrhea, melena, BRBPR, dysuria, hematuria or any other acute c/o.      2023: Seen at bedside, resting, no acute distress      PAST MEDICAL & SURGICAL HISTORY:    No significant past surgical history        MEDICATIONS  (STANDING):    cefepime   IVPB      cefepime   IVPB 2000 milliGRAM(s) IV Intermittent every 12 hours  cholecalciferol 2000 Unit(s) Oral at bedtime  enoxaparin Injectable 40 milliGRAM(s) SubCutaneous every 24 hours  lactated ringers. 1000 milliLiter(s) (100 mL/Hr) IV Continuous <Continuous>  vancomycin  IVPB 1500 milliGRAM(s) IV Intermittent every 8 hours      MEDICATIONS  (PRN):    acetaminophen     Tablet .. 650 milliGRAM(s) Oral every 6 hours PRN Temp greater or equal to 38C (100.4F), Mild Pain (1 - 3)  aluminum hydroxide/magnesium hydroxide/simethicone Suspension 30 milliLiter(s) Oral every 4 hours PRN Dyspepsia  bisacodyl 5 milliGRAM(s) Oral daily PRN Constipation  bisacodyl Suppository 10 milliGRAM(s) Rectal once PRN Constipation  calcium carbonate    500 mG (Tums) Chewable 3 Tablet(s) Chew every 6 hours PRN Dyspepsia  HYDROmorphone  Injectable 1 milliGRAM(s) IV Push every 4 hours PRN Severe Pain (7 - 10)  metoclopramide Injectable 10 milliGRAM(s) IV Push every 6 hours PRN Nausea and/or Vomiting not relieved by Zofran  ondansetron Injectable 4 milliGRAM(s) IV Push every 6 hours PRN Nausea  oxyCODONE    IR 10 milliGRAM(s) Oral every 4 hours PRN Moderate Pain (4 - 6)      ALLERGIES:    penicillin (Rash (Mild))        FAMILY HISTORY:    Nothing to note      REVIEW OF SYSTEMS:    Constitutional, Eyes, ENT, Cardiovascular, Respiratory, Gastrointestinal, Genitourinary, Musculoskeletal, Integumentary, Neurological, Psychiatric, Endocrine, Heme/Lymph and Allergic/Immunologic review of systems are otherwise negative except as noted in HPI.       VITALS:    T(C): 37.2 (2023 08:25), Max: 38.6 (2023 16:29)  T(F): 99 (2023 08:25), Max: 101.4 (2023 16:29)  HR: 77 (2023 08:25) (72 - 96)  BP: 113/52 (2023 08:25) (113/52 - 153/70)  BP(mean): 65 (2023 06:18) (65 - 65)  RR: 16 (2023 08:25) (16 - 20)  SpO2: 97% (2023 08:25) (96% - 99%)    Parameters below as of 2023 08:25  Patient On (Oxygen Delivery Method): room air        PHYSICAL:    Constitutional: no acute distress  Eyes: PERRL, EOMI, no conjunctival infection, anicteric  ENT: pharynx is unremarkable, moist mucus membrane, no oral lesions  Neck: supple without JVD   Pulmonary: clear to auscultation bilaterally, no dullness, no wheezing  Cardiac: RRR, normal S1S2  Vascular: no JVD, no calf tenderness, venous stasis changes, varices  Abdomen: normoactive bowel sounds, soft and nontender, no hepatosplenomegaly or masses appreciated  Lymphatic: no peripheral adenopathy appreciated  Musculoskeletal: full range of motion and no deformities appreciated x4 extremities; IM drain in place L anterior mid thigh region with scant serosanguinous fluid collection in bag  Skin: normal appearance, no rash, nodules, vesicles, ulcers, erythema  Neurology: grossly intact  Psychiatric: affect/mood appropriate      LABS:    CBC Full  -  ( 2023 05:58 )  WBC Count : 11.74 K/uL  RBC Count : 4.00 M/uL  Hemoglobin : 11.2 g/dL  Hematocrit : 32.6 %  Platelet Count - Automated : 384 K/uL  Mean Cell Volume : 81.5 fl  Mean Cell Hemoglobin : 28.0 pg  Mean Cell Hemoglobin Concentration : 34.4 gm/dL  Auto Neutrophil # : 9.45 K/uL  Auto Lymphocyte # : 1.03 K/uL  Auto Monocyte # : 0.92 K/uL  Auto Eosinophil # : 0.13 K/uL  Auto Basophil # : 0.03 K/uL  Auto Neutrophil % : 80.5 %  Auto Lymphocyte % : 8.8 %  Auto Monocyte % : 7.8 %  Auto Eosinophil % : 1.1 %  Auto Basophil % : 0.3 %        133<L>  |  98  |  15  ----------------------------<  112<H>  3.8   |  31  |  0.89    Ca    8.4<L>      2023 05:58  Phos  3.7       Mg     2.0         TPro  6.5  /  Alb  2.2<L>  /  TBili  0.5  /  DBili  x   /  AST  27  /  ALT  114<H>  /  AlkPhos  37<L>      PT/INR - ( 2023 05:58 )   PT: 21.9 sec;   INR: 1.88 ratio           Urinalysis Basic - ( 2023 23:27 )    Color: Yellow / Appearance: Clear / S.015 / pH: x  Gluc: x / Ketone: Negative  / Bili: Negative / Urobili: 8   Blood: x / Protein: Negative / Nitrite: Negative   Leuk Esterase: Negative / RBC: x / WBC x   Sq Epi: x / Non Sq Epi: x / Bacteria: x        RADIOLOGY & ADDITIONAL STUDIES:        CT Lower Extremity w/ IV Cont, Left (23 @ 13:26)      IMPRESSION:    Large peripherally enhancing complex appearing fluid collection at the   lateral aspect of the vastus lateralis muscle at the level the proximal   third of the femur consistent with an intramuscular abscess. Adjacent   soft tissue swelling compatible with cellulitis.            Xray Chest 1 View- PORTABLE-Urgent (Xray Chest 1 View- PORTABLE-Urgent .) (23 @ 22:53)    IMPRESSION: No acute finding.            US Abdomen Complete (US Abdomen Complete .) (23 @ 07:52)    IMPRESSION:  Hepatomegaly.    Echogenic kidneys which can be seen with medical renal disease. Trace   right perinephric fluid.       HPI:    33 y/o M otherwise healthy who presented to the ED with L thigh abscess x2 weeks. He admitted that he initially felt pain with localized swelling which slowly progressed and then developed fever/chills. At that point he went to a local urgent care, given Clindamycin which he took for about 3 days, symptoms did not improve, went back to the urgent care, received a xray that did not show anything acute, advised that he likely had a muscle strain. He noted that the symptoms continued to worsen to the point that he came to the ED. He admitted that he is a competing  and takes various amounts of supplements currently including creatine, whey protein, arginine, Vit B6 and Vit B12 injections. He admitted that he has used exogenous testosterone in the past which he has injected into his shoulders/thighs/gluts, but has not used in about 1.5 - 2 years. He admitted the most recent Vit B12 injection was about 3-4 weeks ago before the L thigh symptoms started. He is otherwise well currently and denies any cough, HA, vision/hearing changes, palpitations, SOB, CANSECO, wheezing, abdominal pain, nausea, vomiting, diarrhea, melena, BRBPR, dysuria, hematuria or any other acute c/o.      2023: Seen at bedside, resting, no acute distress      PAST MEDICAL & SURGICAL HISTORY:    No significant past surgical history        MEDICATIONS  (STANDING):    cefepime   IVPB      cefepime   IVPB 2000 milliGRAM(s) IV Intermittent every 12 hours  cholecalciferol 2000 Unit(s) Oral at bedtime  enoxaparin Injectable 40 milliGRAM(s) SubCutaneous every 24 hours  lactated ringers. 1000 milliLiter(s) (100 mL/Hr) IV Continuous <Continuous>  vancomycin  IVPB 1500 milliGRAM(s) IV Intermittent every 8 hours      MEDICATIONS  (PRN):    acetaminophen     Tablet .. 650 milliGRAM(s) Oral every 6 hours PRN Temp greater or equal to 38C (100.4F), Mild Pain (1 - 3)  aluminum hydroxide/magnesium hydroxide/simethicone Suspension 30 milliLiter(s) Oral every 4 hours PRN Dyspepsia  bisacodyl 5 milliGRAM(s) Oral daily PRN Constipation  bisacodyl Suppository 10 milliGRAM(s) Rectal once PRN Constipation  calcium carbonate    500 mG (Tums) Chewable 3 Tablet(s) Chew every 6 hours PRN Dyspepsia  HYDROmorphone  Injectable 1 milliGRAM(s) IV Push every 4 hours PRN Severe Pain (7 - 10)  metoclopramide Injectable 10 milliGRAM(s) IV Push every 6 hours PRN Nausea and/or Vomiting not relieved by Zofran  ondansetron Injectable 4 milliGRAM(s) IV Push every 6 hours PRN Nausea  oxyCODONE    IR 10 milliGRAM(s) Oral every 4 hours PRN Moderate Pain (4 - 6)      ALLERGIES:    penicillin (Rash (Mild))        FAMILY HISTORY:    Nothing to note      REVIEW OF SYSTEMS:    Constitutional, Eyes, ENT, Cardiovascular, Respiratory, Gastrointestinal, Genitourinary, Musculoskeletal, Integumentary, Neurological, Psychiatric, Endocrine, Heme/Lymph and Allergic/Immunologic review of systems are otherwise negative except as noted in HPI.       VITALS:    T(C): 37.2 (2023 08:25), Max: 38.6 (2023 16:29)  T(F): 99 (2023 08:25), Max: 101.4 (2023 16:29)  HR: 77 (2023 08:25) (72 - 96)  BP: 113/52 (2023 08:25) (113/52 - 153/70)  BP(mean): 65 (2023 06:18) (65 - 65)  RR: 16 (2023 08:25) (16 - 20)  SpO2: 97% (2023 08:25) (96% - 99%)    Parameters below as of 2023 08:25  Patient On (Oxygen Delivery Method): room air        PHYSICAL:    Constitutional: no acute distress  Eyes: PERRL, EOMI, no conjunctival infection, anicteric  ENT: pharynx is unremarkable, moist mucus membrane, no oral lesions  Neck: supple without JVD   Pulmonary: clear to auscultation bilaterally, no dullness, no wheezing  Cardiac: RRR, normal S1S2  Vascular: no JVD, no calf tenderness, venous stasis changes, varices  Abdomen: normoactive bowel sounds, soft and nontender, no hepatosplenomegaly or masses appreciated  Lymphatic: no peripheral adenopathy appreciated  Musculoskeletal: full range of motion and no deformities appreciated x4 extremities; IM drain in place L anterior mid thigh region with scant serosanguinous fluid collection in bag  Skin: normal appearance, no rash, nodules, vesicles, ulcers, erythema  Neurology: grossly intact  Psychiatric: affect/mood appropriate      LABS:    CBC Full  -  ( 2023 05:58 )  WBC Count : 11.74 K/uL  RBC Count : 4.00 M/uL  Hemoglobin : 11.2 g/dL  Hematocrit : 32.6 %  Platelet Count - Automated : 384 K/uL  Mean Cell Volume : 81.5 fl  Mean Cell Hemoglobin : 28.0 pg  Mean Cell Hemoglobin Concentration : 34.4 gm/dL  Auto Neutrophil # : 9.45 K/uL  Auto Lymphocyte # : 1.03 K/uL  Auto Monocyte # : 0.92 K/uL  Auto Eosinophil # : 0.13 K/uL  Auto Basophil # : 0.03 K/uL  Auto Neutrophil % : 80.5 %  Auto Lymphocyte % : 8.8 %  Auto Monocyte % : 7.8 %  Auto Eosinophil % : 1.1 %  Auto Basophil % : 0.3 %        133<L>  |  98  |  15  ----------------------------<  112<H>  3.8   |  31  |  0.89    Ca    8.4<L>      2023 05:58  Phos  3.7       Mg     2.0         TPro  6.5  /  Alb  2.2<L>  /  TBili  0.5  /  DBili  x   /  AST  27  /  ALT  114<H>  /  AlkPhos  37<L>      PT/INR - ( 2023 05:58 )   PT: 21.9 sec;   INR: 1.88 ratio       PTT 29.7     Urinalysis Basic - ( 2023 23:27 )    Color: Yellow / Appearance: Clear / S.015 / pH: x  Gluc: x / Ketone: Negative  / Bili: Negative / Urobili: 8   Blood: x / Protein: Negative / Nitrite: Negative   Leuk Esterase: Negative / RBC: x / WBC x   Sq Epi: x / Non Sq Epi: x / Bacteria: x        RADIOLOGY & ADDITIONAL STUDIES:        CT Lower Extremity w/ IV Cont, Left (23 @ 13:26)      IMPRESSION:    Large peripherally enhancing complex appearing fluid collection at the   lateral aspect of the vastus lateralis muscle at the level the proximal   third of the femur consistent with an intramuscular abscess. Adjacent   soft tissue swelling compatible with cellulitis.            Xray Chest 1 View- PORTABLE-Urgent (Xray Chest 1 View- PORTABLE-Urgent .) (23 @ 22:53)    IMPRESSION: No acute finding.            US Abdomen Complete (US Abdomen Complete .) (23 @ 07:52)    IMPRESSION:  Hepatomegaly.    Echogenic kidneys which can be seen with medical renal disease. Trace   right perinephric fluid.       HPI:    33 y/o M otherwise healthy who presented to the ED with L thigh abscess x2 weeks. He admitted that he initially felt pain with localized swelling which slowly progressed and then developed fever/chills. At that point he went to a local urgent care, given Clindamycin which he took for about 3 days, symptoms did not improve, went back to the urgent care, received a xray that did not show anything acute, advised that he likely had a muscle strain. He noted that the symptoms continued to worsen to the point that he came to the ED. He admitted that he is a competing  and takes various amounts of supplements currently including creatine, whey protein, arginine, Vit B6 and Vit B12 injections. He admitted that he has used exogenous testosterone in the past which he has injected into his shoulders/thighs/gluts, but has not used in about 1.5 - 2 years. He admitted the most recent Vit B12 injection was about 3-4 weeks ago before the L thigh symptoms started. He is otherwise well currently and denies any cough, HA, vision/hearing changes, palpitations, SOB, CANSECO, wheezing, abdominal pain, nausea, vomiting, diarrhea, melena, BRBPR, dysuria, hematuria or any other acute c/o.      2023: Seen at bedside, resting, no acute distress      PAST MEDICAL & SURGICAL HISTORY:    No significant past surgical history        MEDICATIONS  (STANDING):    cefepime   IVPB      cefepime   IVPB 2000 milliGRAM(s) IV Intermittent every 12 hours  cholecalciferol 2000 Unit(s) Oral at bedtime  enoxaparin Injectable 40 milliGRAM(s) SubCutaneous every 24 hours  lactated ringers. 1000 milliLiter(s) (100 mL/Hr) IV Continuous <Continuous>  vancomycin  IVPB 1500 milliGRAM(s) IV Intermittent every 8 hours      MEDICATIONS  (PRN):    acetaminophen     Tablet .. 650 milliGRAM(s) Oral every 6 hours PRN Temp greater or equal to 38C (100.4F), Mild Pain (1 - 3)  aluminum hydroxide/magnesium hydroxide/simethicone Suspension 30 milliLiter(s) Oral every 4 hours PRN Dyspepsia  bisacodyl 5 milliGRAM(s) Oral daily PRN Constipation  bisacodyl Suppository 10 milliGRAM(s) Rectal once PRN Constipation  calcium carbonate    500 mG (Tums) Chewable 3 Tablet(s) Chew every 6 hours PRN Dyspepsia  HYDROmorphone  Injectable 1 milliGRAM(s) IV Push every 4 hours PRN Severe Pain (7 - 10)  metoclopramide Injectable 10 milliGRAM(s) IV Push every 6 hours PRN Nausea and/or Vomiting not relieved by Zofran  ondansetron Injectable 4 milliGRAM(s) IV Push every 6 hours PRN Nausea  oxyCODONE    IR 10 milliGRAM(s) Oral every 4 hours PRN Moderate Pain (4 - 6)      ALLERGIES:    penicillin (Rash (Mild))        FAMILY HISTORY:    Nothing to note      REVIEW OF SYSTEMS:    Constitutional, Eyes, ENT, Cardiovascular, Respiratory, Gastrointestinal, Genitourinary, Musculoskeletal, Integumentary, Neurological, Psychiatric, Endocrine, Heme/Lymph and Allergic/Immunologic review of systems are otherwise negative except as noted in HPI.       VITALS:    T(C): 37.2 (2023 08:25), Max: 38.6 (2023 16:29)  T(F): 99 (2023 08:25), Max: 101.4 (2023 16:29)  HR: 77 (2023 08:25) (72 - 96)  BP: 113/52 (2023 08:25) (113/52 - 153/70)  BP(mean): 65 (2023 06:18) (65 - 65)  RR: 16 (2023 08:25) (16 - 20)  SpO2: 97% (2023 08:25) (96% - 99%)    Parameters below as of 2023 08:25  Patient On (Oxygen Delivery Method): room air        PHYSICAL:    Constitutional: no acute distress  Eyes: PERRL, EOMI, no conjunctival infection, anicteric  ENT: pharynx is unremarkable, moist mucus membrane, no oral lesions  Neck: supple without JVD   Pulmonary: clear to auscultation bilaterally, no dullness, no wheezing  Cardiac: RRR, normal S1S2  Vascular: no JVD, no calf tenderness, venous stasis changes, varices  Abdomen: normoactive bowel sounds, soft and nontender, no hepatosplenomegaly or masses appreciated  Lymphatic: no peripheral adenopathy appreciated  Musculoskeletal: full range of motion and no deformities appreciated x4 extremities; L thigh with swelling, warmth, minimal TTP; IM drain in place L anterior mid thigh region with serosanguinous fluid collection in bag  Skin: normal appearance overall, no vesicles, rashes, lesions  Neurology: grossly intact  Psychiatric: affect/mood appropriate      LABS:    CBC Full  -  ( 2023 05:58 )  WBC Count : 11.74 K/uL  RBC Count : 4.00 M/uL  Hemoglobin : 11.2 g/dL  Hematocrit : 32.6 %  Platelet Count - Automated : 384 K/uL  Mean Cell Volume : 81.5 fl  Mean Cell Hemoglobin : 28.0 pg  Mean Cell Hemoglobin Concentration : 34.4 gm/dL  Auto Neutrophil # : 9.45 K/uL  Auto Lymphocyte # : 1.03 K/uL  Auto Monocyte # : 0.92 K/uL  Auto Eosinophil # : 0.13 K/uL  Auto Basophil # : 0.03 K/uL  Auto Neutrophil % : 80.5 %  Auto Lymphocyte % : 8.8 %  Auto Monocyte % : 7.8 %  Auto Eosinophil % : 1.1 %  Auto Basophil % : 0.3 %        133<L>  |  98  |  15  ----------------------------<  112<H>  3.8   |  31  |  0.89    Ca    8.4<L>      2023 05:58  Phos  3.7       Mg     2.0         TPro  6.5  /  Alb  2.2<L>  /  TBili  0.5  /  DBili  x   /  AST  27  /  ALT  114<H>  /  AlkPhos  37<L>      PT/INR - ( 2023 05:58 )   PT: 21.9 sec;   INR: 1.88 ratio       PTT 29.7     Urinalysis Basic - ( 2023 23:27 )    Color: Yellow / Appearance: Clear / S.015 / pH: x  Gluc: x / Ketone: Negative  / Bili: Negative / Urobili: 8   Blood: x / Protein: Negative / Nitrite: Negative   Leuk Esterase: Negative / RBC: x / WBC x   Sq Epi: x / Non Sq Epi: x / Bacteria: x        RADIOLOGY & ADDITIONAL STUDIES:        CT Lower Extremity w/ IV Cont, Left (23 @ 13:26)      IMPRESSION:    Large peripherally enhancing complex appearing fluid collection at the   lateral aspect of the vastus lateralis muscle at the level the proximal   third of the femur consistent with an intramuscular abscess. Adjacent   soft tissue swelling compatible with cellulitis.            Xray Chest 1 View- PORTABLE-Urgent (Xray Chest 1 View- PORTABLE-Urgent .) (23 @ 22:53)    IMPRESSION: No acute finding.            US Abdomen Complete (US Abdomen Complete .) (23 @ 07:52)    IMPRESSION:  Hepatomegaly.    Echogenic kidneys which can be seen with medical renal disease. Trace   right perinephric fluid.

## 2023-02-21 NOTE — PROGRESS NOTE ADULT - SUBJECTIVE AND OBJECTIVE BOX
Chief complain:  Left thigh pain     HPI: 32M with no significant PMHx presented to [facility] on 2023 with left thigh abscess x2 weeks. Pt. reports subjective fevers for the past 2 days. Pt. was seen last week at urgent care and was initially treated with Clinda x3 days, though without improvement. He saw urgent care again and was told to stop taking antibiotic because it was likely a muscle strain. Pt. has since noted increased firmness, no spontaneous drainage, or drainage procedures. He reports a Vitamin B12 injection to the left thigh 3 weeks prior. Pt endorses taking Ibuprofen without improvement.    Hospital Course:   - drainage of left thigh abscess; 350cc drained during procedure    Interval History:   - Patient seen and examined at bedside earlier today, febrile, pain in the leg persist, percocet 5 mg not effective, events noted overnight, chest tightness, denies cough, cp, dyspnea now, denies abdominal pain, tolerating po diet, plan discussed.   - Pt. seen and examined at bedside earlier today    Review of system - Rest of the review of system are negative except mentioned in HPI.    Vital Signs Last 24 Hrs  T(C): 37.2 (2023 15:00), Max: 38.6 (2023 16:29)  T(F): 99 (2023 15:00), Max: 101.4 (2023 16:29)  HR: 71 (2023 15:00) (71 - 96)  BP: 124/54 (2023 15:00) (113/52 - 153/70)  BP(mean): 65 (2023 06:18) (65 - 65)  RR: 18 (2023 15:00) (16 - 18)  SpO2: 98% (2023 15:00) (96% - 99%)  Parameters below as of 2023 15:00  Patient On (Oxygen Delivery Method): room air    Physical exam:   General : NAD, appear to be of stated age, well groomed   NERVOUS SYSTEM:  A&Ox3, non-focal exam, motor Strength 5/5 bialteral upper and lower extremities; DTRs 2+ intact and symmetric  HEAD:  Atraumatic, Normocephalic  EYES: EOMI, PERRLA, conjunctiva and sclera clear  HEENT: Moist mucous membranes, supple neck, no JVD  CHEST: Clear to auscultation bilaterally; No rales, no rhonchi, no wheezing  HEART: Regular rate and rhythm; No murmurs, no rubs or gallops  ABDOMEN: Soft, non-tender, Non-distended; Bowel sounds present, no guarding , no peritoneal irritation   GENITOURINARY: Voiding, no suprapubic tenderness  EXTREMITIES:  2+ Peripheral Pulses, No clubbing, cyanosis, edema  MUSCULOSKELETAL: No muscle tenderness, muscle tone normal, no joint tenderness or swelling noted; FROM  SKIN: No rash, no lesion    Labs radiologic and other test: all reviewed and interpret       133<L>  |  98  |  15  ----------------------------<  112<H>  3.8   |  31  |  0.89    Ca    8.4<L>      2023 05:58  Phos  3.7       Mg     2.0         TPro  6.5  /  Alb  2.2<L>  /  TBili  0.5  /  DBili  x   /  AST  27  /  ALT  114<H>  /  AlkPhos  37<L>                 11.2   11.74 )-----------( 384      ( 2023 05:58 )             32.6     CARDIAC MARKERS ( 2023 20:26 )  x     / x     / 113 U/L / x     / x        LIVER FUNCTIONS - ( 2023 05:58 )  Alb: 2.2 g/dL / Pro: 6.5 gm/dL / ALK PHOS: 37 U/L / ALT: 114 U/L / AST: 27 U/L / GGT: x           PT/INR - ( 2023 05:58 )   PT: 21.9 sec;   INR: 1.88 ratio      Urinalysis Basic - ( 2023 23:27 )  Color: Yellow / Appearance: Clear / S.015 / pH: x  Gluc: x / Ketone: Negative  / Bili: Negative / Urobili: 8   Blood: x / Protein: Negative / Nitrite: Negative   Leuk Esterase: Negative / RBC: x / WBC x   Sq Epi: x / Non Sq Epi: x / Bacteria: x    Acute Hepatitis Panel (23 @ 20:26)  Hepatitis C Virus Interpretation: Nonreact: Hepatitis C AB  S/CO Ratio                        Interpretation  < 1.00                                   Non-Reactive  1.00 - 4.99                         Weakly-Reactive  >= 5.00                                Reactive  Non-Reactive: A person witha non-reactive HCV antibody result is  considered uninfected.  No further action is needed unless recent  infection is suspected.  In these cases, consider repeat testing later to  detect seroconversion..  Weakly-Reactive: HCV antibody test is abnormal, HCV RNA Qualitative test  will follow.  Reactive: HCV antibody test is abnormal, HCV RNA Qualitative test will  follow.  Note: HCV antibody testing is performed on the Abbott  system.    Hepatitis C Virus S/CO Ratio: 0.53 S/CO    Hepatitis B Core IgM Antibody: Nonreact    Hepatitis B Surface Antigen: Nonreact    Hepatitis A IgM Antibody: Nonreact  Ferritin, Serum (23 @ 20:26)    Ferritin, Serum: 1351 ng/mL    Cortisol AM, Serum . (23 @ 07:14)    Cortisol AM, Serum: 12.2 ug/dL    Iron with Total Binding Capacity (23 @ 20:26)    Iron - Total Binding Capacity.: 192 ug/dL    % Saturation, Iron: 10 %    Iron Total, Serum: 19 ug/dL    Unsaturated Iron Binding Capacity: 173 ug/dL    Vitamin B12, Serum (23 @ 20:26)    Vitamin B12, Serum: 1515 pg/mL    Vitamin D, 25-Hydroxy (23 @ 20:26)    Vitamin D, 25-Hydroxy: 17.8:     RECENT CULTURES:  Culture - Blood (23 @ 12:36)    Specimen Source: .Blood None    Culture Results:   No growth to date.    Culture - Blood (23 @ 12:36)    Specimen Source: .Blood None    Culture Results:   No growth to date.    Cardiac testing: reviewed   EKG   12 Lead ECG (23 @ 11:53) >  Ventricular Rate 76 BPM  QTC Calculation(Bazett) 402 ms  Diagnosis Line Normal sinus rhythm  Nonspecific T wave abnormality  No previous ECGs available    US Abdomen Complete (US Abdomen Complete .) (23 @ 07:52) >  FINDINGS:  Liver: Enlarged, measuring 21 cm in length..  Bile ducts: Normal caliber. Common bile duct measures 2 mm.  Gallbladder: Within normal limits.  Pancreas: Visualized portions are within normal limits.  Spleen: 12.7 cm. Within normal limits.  Right kidney: 12.4 cm. No hydronephrosis. Echogenic kidney. Trace   perinephric fluid.  Left kidney: 13.8 cm. No hydronephrosis. Echogenic kidney.  Aorta and IVC: Visualized portions are within normal limits.  IMPRESSION:  Hepatomegaly.  Echogenic kidneys which can be seen with medical renal disease. Trace   right perinephric fluid.     CT Lower Extremity w/ IV Cont, Left (23 @ 13:26) >  Large peripherally enhancing complex appearing fluid collection at the   lateral aspect of the vastus lateralis muscle at the level the proximal   third of the femur consistent with an intramuscular abscess. Adjacent   soft tissue swelling compatible with cellulitis.    Xray Chest 1 View- PORTABLE-Urgent (Xray Chest 1 View- PORTABLE-Urgent .) (23 @ 22:53)  ACC: [id number] EXAM:  XR CHEST PORTABLE URGENT 1V   ORDERED BY: DAJUAN FERNANDEZ   PROCEDURE DATE:  2023    INTERPRETATION:  AP chest on 2023 at 10:43 PM. Patient has   chest pressure and fever.  COMPARISON: None available.  Heart magnified by technique.  Lungs are clear.  IMPRESSION: No acute finding.  --- End of Report ---    Home Medications:  ascorbic acid 500 mg oral tablet: 1 tab(s) orally once a day (2023 17:10)  ibuprofen 200 mg oral capsule: 1 cap(s) orally every 6 hours, As Needed (2023 17:10)    MEDICATIONS  (STANDING):  cefepime   IVPB      cefepime   IVPB 2000 milliGRAM(s) IV Intermittent every 12 hours  cholecalciferol 2000 Unit(s) Oral at bedtime  enoxaparin Injectable 40 milliGRAM(s) SubCutaneous every 24 hours  lactated ringers. 1000 milliLiter(s) (100 mL/Hr) IV Continuous <Continuous>  vancomycin  IVPB 1500 milliGRAM(s) IV Intermittent every 8 hours    MEDICATIONS  (PRN):  acetaminophen     Tablet .. 650 milliGRAM(s) Oral every 6 hours PRN Temp greater or equal to 38C (100.4F), Mild Pain (1 - 3)  aluminum hydroxide/magnesium hydroxide/simethicone Suspension 30 milliLiter(s) Oral every 4 hours PRN Dyspepsia  bisacodyl 5 milliGRAM(s) Oral daily PRN Constipation  bisacodyl Suppository 10 milliGRAM(s) Rectal once PRN Constipation  calcium carbonate    500 mG (Tums) Chewable 3 Tablet(s) Chew every 6 hours PRN Dyspepsia  HYDROmorphone  Injectable 1 milliGRAM(s) IV Push every 4 hours PRN Severe Pain (7 - 10)  metoclopramide Injectable 10 milliGRAM(s) IV Push every 6 hours PRN Nausea and/or Vomiting not relieved by Zofran  ondansetron Injectable 4 milliGRAM(s) IV Push every 6 hours PRN Nausea  oxyCODONE    IR 10 milliGRAM(s) Oral every 4 hours PRN Moderate Pain (4 - 6) Chief complain:  Left thigh pain     HPI: 32M with no significant PMHx presented to [facility] on 2023 with left thigh abscess x2 weeks. Pt. reports subjective fevers for the past 2 days. Pt. was seen last week at urgent care and was initially treated with Clinda x3 days, though without improvement. He saw urgent care again and was told to stop taking antibiotic because it was likely a muscle strain. Pt. has since noted increased firmness, no spontaneous drainage, or drainage procedures. He reports a Vitamin B12 injection to the left thigh 3 weeks prior. Pt endorses taking Ibuprofen without improvement.    Hospital Course:   - drainage of left thigh abscess; 350cc drained during procedure    Interval History:   - Patient seen and examined at bedside earlier today, febrile, pain in the leg persist, percocet 5 mg not effective, events noted overnight, chest tightness, denies cough, cp, dyspnea now, denies abdominal pain, tolerating po diet, plan discussed.   - Pt. seen and examined at bedside earlier today, feels better, less pain in the leg, + febrile , tolerating IV abx    Review of system - Rest of the review of system are negative except mentioned in HPI.    Vital Signs Last 24 Hrs  T(C): 37.2 (2023 15:00), Max: 38.6 (2023 16:29)  T(F): 99 (2023 15:00), Max: 101.4 (2023 16:29)  HR: 71 (2023 15:00) (71 - 96)  BP: 124/54 (2023 15:00) (113/52 - 153/70)  BP(mean): 65 (2023 06:18) (65 - 65)  RR: 18 (2023 15:00) (16 - 18)  SpO2: 98% (2023 15:00) (96% - 99%)  Parameters below as of 2023 15:00  Patient On (Oxygen Delivery Method): room air    Physical exam:   General : NAD, appear to be of stated age, well groomed   NERVOUS SYSTEM:  A&Ox3, non-focal exam, motor Strength 5/5 bialteral upper and lower extremities; DTRs 2+ intact and symmetric  HEAD:  Atraumatic, Normocephalic  EYES: EOMI, PERRLA, conjunctiva and sclera clear  HEENT: Moist mucous membranes, supple neck, no JVD  CHEST: Clear to auscultation bilaterally; No rales, no rhonchi, no wheezing  HEART: Regular rate and rhythm; No murmurs, no rubs or gallops  ABDOMEN: Soft, non-tender, Non-distended; Bowel sounds present, no guarding , no peritoneal irritation   GENITOURINARY: Voiding, no suprapubic tenderness  EXTREMITIES:  2+ Peripheral Pulses, No clubbing, cyanosis, edema  MUSCULOSKELETAL: No muscle tenderness, muscle tone normal, no joint tenderness or swelling noted; FROM  SKIN: No rash, Left thigh drain in place with serosanguinous discharge     Labs radiologic and other test: all reviewed and interpret       133<L>  |  98  |  15  ----------------------------<  112<H>  3.8   |  31  |  0.89    Ca    8.4<L>      2023 05:58  Phos  3.7       Mg     2.0         TPro  6.5  /  Alb  2.2<L>  /  TBili  0.5  /  DBili  x   /  AST  27  /  ALT  114<H>  /  AlkPhos  37<L>                 11.2   11.74 )-----------( 384      ( 2023 05:58 )             32.6     CARDIAC MARKERS ( 2023 20:26 )  x     / x     / 113 U/L / x     / x        LIVER FUNCTIONS - ( 2023 05:58 )  Alb: 2.2 g/dL / Pro: 6.5 gm/dL / ALK PHOS: 37 U/L / ALT: 114 U/L / AST: 27 U/L / GGT: x           PT/INR - ( 2023 05:58 )   PT: 21.9 sec;   INR: 1.88 ratio      Urinalysis Basic - ( 2023 23:27 )  Color: Yellow / Appearance: Clear / S.015 / pH: x  Gluc: x / Ketone: Negative  / Bili: Negative / Urobili: 8   Blood: x / Protein: Negative / Nitrite: Negative   Leuk Esterase: Negative / RBC: x / WBC x   Sq Epi: x / Non Sq Epi: x / Bacteria: x    Acute Hepatitis Panel (23 @ 20:26)  Hepatitis C Virus Interpretation: Nonreact: Hepatitis C AB  S/CO Ratio                        Interpretation  < 1.00                                   Non-Reactive  1.00 - 4.99                         Weakly-Reactive  >= 5.00                                Reactive  Non-Reactive: A person witha non-reactive HCV antibody result is  considered uninfected.  No further action is needed unless recent  infection is suspected.  In these cases, consider repeat testing later to  detect seroconversion..  Weakly-Reactive: HCV antibody test is abnormal, HCV RNA Qualitative test  will follow.  Reactive: HCV antibody test is abnormal, HCV RNA Qualitative test will  follow.  Note: HCV antibody testing is performed on the Abbott  system.    Hepatitis C Virus S/CO Ratio: 0.53 S/CO    Hepatitis B Core IgM Antibody: Nonreact    Hepatitis B Surface Antigen: Nonreact    Hepatitis A IgM Antibody: Nonreact  Ferritin, Serum (23 @ 20:26)    Ferritin, Serum: 1351 ng/mL    Cortisol AM, Serum . (23 @ 07:14)    Cortisol AM, Serum: 12.2 ug/dL    Iron with Total Binding Capacity (23 @ 20:26)    Iron - Total Binding Capacity.: 192 ug/dL    % Saturation, Iron: 10 %    Iron Total, Serum: 19 ug/dL    Unsaturated Iron Binding Capacity: 173 ug/dL    Vitamin B12, Serum (23 @ 20:26)    Vitamin B12, Serum: 1515 pg/mL    Vitamin D, 25-Hydroxy (23 @ 20:26)    Vitamin D, 25-Hydroxy: 17.8:     RECENT CULTURES:  Culture - Blood (23 @ 12:36)    Specimen Source: .Blood None    Culture Results:   No growth to date.    Culture - Blood (23 @ 12:36)    Specimen Source: .Blood None    Culture Results:   No growth to date.    Cardiac testing: reviewed   EKG   12 Lead ECG (23 @ 11:53) >  Ventricular Rate 76 BPM  QTC Calculation(Bazett) 402 ms  Diagnosis Line Normal sinus rhythm  Nonspecific T wave abnormality  No previous ECGs available    US Abdomen Complete (US Abdomen Complete .) (23 @ 07:52) >  FINDINGS:  Liver: Enlarged, measuring 21 cm in length..  Bile ducts: Normal caliber. Common bile duct measures 2 mm.  Gallbladder: Within normal limits.  Pancreas: Visualized portions are within normal limits.  Spleen: 12.7 cm. Within normal limits.  Right kidney: 12.4 cm. No hydronephrosis. Echogenic kidney. Trace   perinephric fluid.  Left kidney: 13.8 cm. No hydronephrosis. Echogenic kidney.  Aorta and IVC: Visualized portions are within normal limits.  IMPRESSION:  Hepatomegaly.  Echogenic kidneys which can be seen with medical renal disease. Trace   right perinephric fluid.     CT Lower Extremity w/ IV Cont, Left (23 @ 13:26) >  Large peripherally enhancing complex appearing fluid collection at the   lateral aspect of the vastus lateralis muscle at the level the proximal   third of the femur consistent with an intramuscular abscess. Adjacent   soft tissue swelling compatible with cellulitis.    Xray Chest 1 View- PORTABLE-Urgent (Xray Chest 1 View- PORTABLE-Urgent .) (23 @ 22:53)  ACC: [id number] EXAM:  XR CHEST PORTABLE URGENT 1V   ORDERED BY: DAJUAN FERNANDEZ   PROCEDURE DATE:  2023    INTERPRETATION:  AP chest on 2023 at 10:43 PM. Patient has   chest pressure and fever.  COMPARISON: None available.  Heart magnified by technique.  Lungs are clear.  IMPRESSION: No acute finding.  --- End of Report ---    Home Medications:  ascorbic acid 500 mg oral tablet: 1 tab(s) orally once a day (2023 17:10)  ibuprofen 200 mg oral capsule: 1 cap(s) orally every 6 hours, As Needed (2023 17:10)    MEDICATIONS  (STANDING):  cefepime   IVPB      cefepime   IVPB 2000 milliGRAM(s) IV Intermittent every 12 hours  cholecalciferol 2000 Unit(s) Oral at bedtime  enoxaparin Injectable 40 milliGRAM(s) SubCutaneous every 24 hours  lactated ringers. 1000 milliLiter(s) (100 mL/Hr) IV Continuous <Continuous>  vancomycin  IVPB 1500 milliGRAM(s) IV Intermittent every 8 hours    MEDICATIONS  (PRN):  acetaminophen     Tablet .. 650 milliGRAM(s) Oral every 6 hours PRN Temp greater or equal to 38C (100.4F), Mild Pain (1 - 3)  aluminum hydroxide/magnesium hydroxide/simethicone Suspension 30 milliLiter(s) Oral every 4 hours PRN Dyspepsia  bisacodyl 5 milliGRAM(s) Oral daily PRN Constipation  bisacodyl Suppository 10 milliGRAM(s) Rectal once PRN Constipation  calcium carbonate    500 mG (Tums) Chewable 3 Tablet(s) Chew every 6 hours PRN Dyspepsia  HYDROmorphone  Injectable 1 milliGRAM(s) IV Push every 4 hours PRN Severe Pain (7 - 10)  metoclopramide Injectable 10 milliGRAM(s) IV Push every 6 hours PRN Nausea and/or Vomiting not relieved by Zofran  ondansetron Injectable 4 milliGRAM(s) IV Push every 6 hours PRN Nausea  oxyCODONE    IR 10 milliGRAM(s) Oral every 4 hours PRN Moderate Pain (4 - 6)

## 2023-02-21 NOTE — CDI QUERY NOTE - NSCDIOTHERTXTBX_GEN_ALL_CORE_HH
Please document the relationship between the following conditions:  - Sepsis POA associated with LLE cellulitis is a complication of B 12 injection   - Sepsis POA associated with LLE cellulitis is not  a complication of B 12 injection  - Unable to determine  - Other ( Please specify)       SUPPORTING DOCUMENTATION AND/OR CLINICAL EVIDENCE:    H&P documentation on 2/18/2023:  31 y/o male otherwise healthy who presented today with left thigh abscess x 2 weeks. Pt reports subjective fevers for the past 2 days. Pt was seen last week at , was initially treated with Clinda x 3 days, though without improvement. He saw  again and was told to stop taking antibiotic because it was likely a muscle strain. Pt has since noted increased firmness, no spontaneous drainage or drainage procedures. He reports a Vitamin B12 injection to the left thigh 3 weeks prior    Left thigh intra muscular abscess. Continue IV abx. IR consult for ultrasound guided drainage.      Hospitalist progress note on 2/20/2023:  Left thigh pain , Fever due to Sepsis, POA , LLE cellulitis with intramuscular abscess    T(C): 37.3 (19 Feb 2023 13:12), Max: 38.6 (19 Feb 2023 05:20)  T(F): 99.2 (19 Feb 2023 13:12), Max: 101.4 (19 Feb 2023 05:20)  HR: 82 (19 Feb 2023 13:12) (82 - 93)  BP: 140/68 (19 Feb 2023 13:12) (117/55 - 140/68)  BP(mean): 92 (18 Feb 2023 21:27) (92 - 92)  RR: 18 (19 Feb 2023 13:12) (18 - 20)  SpO2: 97% (19 Feb 2023 13:12) (96% - 99%)    WBC Count: 11.74 K/uL (02.21.23 @ 05:58)   WBC Count: 12.97 K/uL (02.20.23 @ 07:14)   WBC Count: 10.84 K/uL (02.19.23 @ 07:14)   WBC Count: 11.87 K/uL (02.18.23 @ 12:36)     Lactate, Blood: 1.1 mmol/L (02.18.23 @ 12:36)

## 2023-02-21 NOTE — CONSULT NOTE ADULT - ASSESSMENT
Mildly elevated ALT-improving ? sec to sepsis vs Drug induced  Hep A,B,c Neg  Mild Hepatomegaly-Not a concern  Rec  Supportive care  Continue present rx  follow up in the Office after D/C

## 2023-02-21 NOTE — CHART NOTE - NSCHARTNOTEFT_GEN_A_CORE
Please document the relationship between the following conditions:    CDI Query:  - Sepsis POA associated with LLE cellulitis is a complication of B12 injection

## 2023-02-22 LAB
ALBUMIN SERPL ELPH-MCNC: 2.1 G/DL — LOW (ref 3.3–5)
ALP SERPL-CCNC: 34 U/L — LOW (ref 40–120)
ALT FLD-CCNC: 103 U/L — HIGH (ref 12–78)
ANION GAP SERPL CALC-SCNC: 4 MMOL/L — LOW (ref 5–17)
AST SERPL-CCNC: 30 U/L — SIGNIFICANT CHANGE UP (ref 15–37)
BASOPHILS # BLD AUTO: 0.02 K/UL — SIGNIFICANT CHANGE UP (ref 0–0.2)
BASOPHILS NFR BLD AUTO: 0.2 % — SIGNIFICANT CHANGE UP (ref 0–2)
BILIRUB SERPL-MCNC: 0.4 MG/DL — SIGNIFICANT CHANGE UP (ref 0.2–1.2)
BUN SERPL-MCNC: 14 MG/DL — SIGNIFICANT CHANGE UP (ref 7–23)
CALCIUM SERPL-MCNC: 8.8 MG/DL — SIGNIFICANT CHANGE UP (ref 8.5–10.1)
CHLORIDE SERPL-SCNC: 101 MMOL/L — SIGNIFICANT CHANGE UP (ref 96–108)
CO2 SERPL-SCNC: 32 MMOL/L — HIGH (ref 22–31)
CREAT SERPL-MCNC: 0.9 MG/DL — SIGNIFICANT CHANGE UP (ref 0.5–1.3)
EGFR: 117 ML/MIN/1.73M2 — SIGNIFICANT CHANGE UP
EOSINOPHIL # BLD AUTO: 0.19 K/UL — SIGNIFICANT CHANGE UP (ref 0–0.5)
EOSINOPHIL NFR BLD AUTO: 2.2 % — SIGNIFICANT CHANGE UP (ref 0–6)
FACT VII ACT/NOR PPP: 21 % — LOW (ref 50–165)
GLUCOSE SERPL-MCNC: 91 MG/DL — SIGNIFICANT CHANGE UP (ref 70–99)
HCT VFR BLD CALC: 32.3 % — LOW (ref 39–50)
HGB BLD-MCNC: 10.9 G/DL — LOW (ref 13–17)
IMM GRANULOCYTES NFR BLD AUTO: 1.9 % — HIGH (ref 0–0.9)
LYMPHOCYTES # BLD AUTO: 1.15 K/UL — SIGNIFICANT CHANGE UP (ref 1–3.3)
LYMPHOCYTES # BLD AUTO: 13.5 % — SIGNIFICANT CHANGE UP (ref 13–44)
MCHC RBC-ENTMCNC: 28 PG — SIGNIFICANT CHANGE UP (ref 27–34)
MCHC RBC-ENTMCNC: 33.7 GM/DL — SIGNIFICANT CHANGE UP (ref 32–36)
MCV RBC AUTO: 83 FL — SIGNIFICANT CHANGE UP (ref 80–100)
MONOCYTES # BLD AUTO: 0.69 K/UL — SIGNIFICANT CHANGE UP (ref 0–0.9)
MONOCYTES NFR BLD AUTO: 8.1 % — SIGNIFICANT CHANGE UP (ref 2–14)
NEUTROPHILS # BLD AUTO: 6.28 K/UL — SIGNIFICANT CHANGE UP (ref 1.8–7.4)
NEUTROPHILS NFR BLD AUTO: 74.1 % — SIGNIFICANT CHANGE UP (ref 43–77)
PLATELET # BLD AUTO: 424 K/UL — HIGH (ref 150–400)
POTASSIUM SERPL-MCNC: 4 MMOL/L — SIGNIFICANT CHANGE UP (ref 3.5–5.3)
POTASSIUM SERPL-SCNC: 4 MMOL/L — SIGNIFICANT CHANGE UP (ref 3.5–5.3)
PROT SERPL-MCNC: 6.5 GM/DL — SIGNIFICANT CHANGE UP (ref 6–8.3)
PYRIDOXAL PHOS SERPL-MCNC: 10.2 UG/L — SIGNIFICANT CHANGE UP (ref 3.4–65.2)
RBC # BLD: 3.89 M/UL — LOW (ref 4.2–5.8)
RBC # FLD: 14.4 % — SIGNIFICANT CHANGE UP (ref 10.3–14.5)
SODIUM SERPL-SCNC: 137 MMOL/L — SIGNIFICANT CHANGE UP (ref 135–145)
VANCOMYCIN TROUGH SERPL-MCNC: 10.5 UG/ML — SIGNIFICANT CHANGE UP (ref 10–20)
WBC # BLD: 8.49 K/UL — SIGNIFICANT CHANGE UP (ref 3.8–10.5)
WBC # FLD AUTO: 8.49 K/UL — SIGNIFICANT CHANGE UP (ref 3.8–10.5)
ZINC SERPL-MCNC: 63 UG/DL — SIGNIFICANT CHANGE UP (ref 44–115)

## 2023-02-22 PROCEDURE — 99232 SBSQ HOSP IP/OBS MODERATE 35: CPT

## 2023-02-22 PROCEDURE — 73701 CT LOWER EXTREMITY W/DYE: CPT | Mod: 26,LT

## 2023-02-22 RX ADMIN — CEFEPIME 100 MILLIGRAM(S): 1 INJECTION, POWDER, FOR SOLUTION INTRAMUSCULAR; INTRAVENOUS at 05:45

## 2023-02-22 RX ADMIN — Medication 300 MILLIGRAM(S): at 22:10

## 2023-02-22 RX ADMIN — Medication 300 MILLIGRAM(S): at 06:28

## 2023-02-22 RX ADMIN — CEFEPIME 100 MILLIGRAM(S): 1 INJECTION, POWDER, FOR SOLUTION INTRAMUSCULAR; INTRAVENOUS at 16:11

## 2023-02-22 RX ADMIN — Medication 300 MILLIGRAM(S): at 14:13

## 2023-02-22 RX ADMIN — Medication 2000 UNIT(S): at 22:10

## 2023-02-22 RX ADMIN — ENOXAPARIN SODIUM 40 MILLIGRAM(S): 100 INJECTION SUBCUTANEOUS at 11:34

## 2023-02-22 RX ADMIN — Medication 650 MILLIGRAM(S): at 23:39

## 2023-02-22 NOTE — PROGRESS NOTE ADULT - SUBJECTIVE AND OBJECTIVE BOX
Feels well, less pain  VSS afeb  Left thigh swelling down a bit. Less tender. Dark bloody drainage from Drain.

## 2023-02-22 NOTE — PROGRESS NOTE ADULT - ATTENDING COMMENTS
32M with no significant PMHx presented to [facility] on 02/18/2023 with left thigh abscess x2 weeks. Pt. reports subjective fevers for the past 2 days. Pt. was seen last week at urgent care and was initially treated with Clinda x3 days, though without improvement. He saw urgent care again and was told to stop taking antibiotic because it was likely a muscle strain. Pt. has since noted increased firmness, no spontaneous drainage, or drainage procedures. He reports a Vitamin B12 injection to the left thigh 3 weeks prior. Pt endorses taking Ibuprofen without improvement.    Hospital Course:  02/20 - drainage of left thigh abscess; 350cc drained during procedure    02/21 - Pt. seen and examined at bedside earlier today,  feels better, less pain in the leg, + febrile , tolerating IV abx    Vital Signs Last 24 Hrs  T(C): 37.2 (21 Feb 2023 15:00), Max: 38.6 (20 Feb 2023 16:29)  T(F): 99 (21 Feb 2023 15:00), Max: 101.4 (20 Feb 2023 16:29)  HR: 71 (21 Feb 2023 15:00) (71 - 96)  BP: 124/54 (21 Feb 2023 15:00) (113/52 - 153/70)  BP(mean): 65 (21 Feb 2023 06:18) (65 - 65)  RR: 18 (21 Feb 2023 15:00) (16 - 18)  SpO2: 98% (21 Feb 2023 15:00) (96% - 99%)    Physical exam:   General : NAD, appear to be of stated age, well groomed   NERVOUS SYSTEM:  A&Ox3, non-focal exam, motor Strength 5/5 bilateral upper and lower extremities; DTRs 2+ intact and symmetric  HEAD:  Atraumatic, Normocephalic  EYES: EOMI, PERRLA, conjunctiva and sclera clear  HEENT: Moist mucous membranes, supple neck, no JVD  CHEST: Clear to auscultation bilaterally; No rales, no rhonchi, no wheezing  HEART: Regular rate and rhythm; No murmurs, no rubs or gallops  ABDOMEN: Soft, non-tender, Non-distended; Bowel sounds present, no guarding , no peritoneal irritation   GENITOURINARY: Voiding, no suprapubic tenderness  EXTREMITIES:  2+ Peripheral Pulses, No clubbing, cyanosis, edema  MUSCULOSKELETAL: No muscle tenderness, muscle tone normal, no joint tenderness or swelling noted; FROM  SKIN: No rash, Left thigh drain in place with serosanguinous discharge     Labs radiologic and other test: all reviewed and interpret    A/P  1. Sepsis, POA, due to LLE cellulitis with intramuscular abscess s/p drain placement on 2/20   - As per surgery ,  C/W IV abx - cefepime and vanco ,  F/U cultures,  C/W IV hydration, antipyretics  s/p  2/20 drainage of left thigh abscess, 350cc drained during procedure    2. Transaminitis resolving, likely adverse drug effect, multiple supplements , Hepatomegaly, Elevated B12 - advised to stop  - Lipid profile, lipase, amylase, hepatitis acute panel neg, TSH WDL,  US abdomen demonstrating hepatomegaly  - On multiple supplements - creatinine, glutathione, L-arginine, whey protein, collagen, apple cider vinegar, injections of B12, B6 vitamins  - High vitamin B12, low vit-D; will order vit-d supplement,  B6 - pending  - GI consult Dr. Jain - no further work-up needed , o/p follow up , hepatomegaly mild    3. Coagulopathy due to sepsis and liver dysfunction, ?DIC due to sepsis, Elevated d-dimers s/p I&D can be falsely elevated due to surgical intervention and sepsis - - S/P IV vitamin K , s/p 10 mg vit K  PO now, it may not help because cause of coagulopathy likely due to sepsis  - As per hematology, monitor DIC labs - Fibrinogen 1335, D-dimer 1200, PT 21.9, INR 1.88,  doppler LE - no DVT,  low suspicion for PE  - no dyspnea, , no hypoxia, no chest pain    4. Chest pain  resolved, due to GERD  -  Maalox and TUMS PRN,  EKG reviewed - some T wave inversion,  Troponin negative, ,  CXR - clear, no acute findings    5. Hyperglycemia due to prediabetes -  A1C 5.7     6. Vitamin D deficiency - Will replace     Thank you for consult, will follow with you. 32M with no significant PMHx presented to [facility] on 02/18/2023 with left thigh abscess x2 weeks. Pt. reports subjective fevers for the past 2 days. Pt. was seen last week at urgent care and was initially treated with Clinda x3 days, though without improvement. He saw urgent care again and was told to stop taking antibiotic because it was likely a muscle strain. Pt. has since noted increased firmness, no spontaneous drainage, or drainage procedures. He reports a Vitamin B12 injection to the left thigh 3 weeks prior. Pt endorses taking Ibuprofen without improvement.    Hospital Course:  02/20 - drainage of left thigh abscess; 350cc drained during procedure    02/21 - Pt. seen and examined at bedside earlier today,  feels better, less pain in the leg, + febrile , tolerating IV abx  2/22 - pt seen and examined, feels better, low grade fever, denies cp, dyspnea abdominal pain    Vital sings reviewed for last 24 h  T(C): 36.8 (02-22-23 @ 16:11), Max: 37.4 (02-21-23 @ 21:50)  T(F): 98.2 (02-22-23 @ 16:11), Max: 99.4 (02-21-23 @ 21:50)  HR: 78 (02-22-23 @ 16:11) (76 - 97)  BP: 130/61 (02-22-23 @ 16:11) (118/57 - 130/61)  RR: 18 (02-22-23 @ 16:11) (16 - 18)  SpO2: 99% (02-22-23 @ 16:11) (97% - 99%)      Physical exam:   General : NAD, appear to be of stated age, well groomed   NERVOUS SYSTEM:  A&Ox3, non-focal exam, motor Strength 5/5 bilateral upper and lower extremities; DTRs 2+ intact and symmetric  HEAD:  Atraumatic, Normocephalic  EYES: EOMI, PERRLA, conjunctiva and sclera clear  HEENT: Moist mucous membranes, supple neck, no JVD  CHEST: Clear to auscultation bilaterally; No rales, no rhonchi, no wheezing  HEART: Regular rate and rhythm; No murmurs, no rubs or gallops  ABDOMEN: Soft, non-tender, Non-distended; Bowel sounds present, no guarding , no peritoneal irritation   GENITOURINARY: Voiding, no suprapubic tenderness  EXTREMITIES:  2+ Peripheral Pulses, No clubbing, cyanosis, edema  MUSCULOSKELETAL: No muscle tenderness, muscle tone normal, no joint tenderness or swelling noted; FROM  SKIN: No rash, Left thigh drain in place with serosanguinous discharge     Labs radiologic and other test: all reviewed and interpret    A/P  1. Sepsis, POA, due to LLE cellulitis with intramuscular abscess s/p drain placement on 2/20   - As per surgery ,  C/W IV abx - cefepime and vanco ,  F/U cultures,  C/W IV hydration, antipyretics  s/p  2/20 drainage of left thigh abscess, 350cc drained during procedure    2. Transaminitis resolving, likely adverse drug effect, multiple supplements , Hepatomegaly, Elevated B12 - advised to stop  - Lipid profile, lipase, amylase, hepatitis acute panel neg, TSH WDL,  US abdomen demonstrating hepatomegaly  - On multiple supplements - creatinine, glutathione, L-arginine, whey protein, collagen, apple cider vinegar, injections of B12, B6 vitamins  - High vitamin B12, low vit-D; will order vit-d supplement,  B6 - pending  - GI consult Dr. Jain - no further work-up needed , o/p follow up , hepatomegaly mild    3. 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 Chest pain  resolved, due to GERD  -  Maalox and TUMS PRN,  EKG reviewed - some T wave inversion,  Troponin negative, ,  CXR - clear, no acute findings    5. Hyperglycemia due to prediabetes -  A1C 5.7     6. Vitamin D deficiency - Will replace     Thank you for consult, will follow with you.

## 2023-02-22 NOTE — PROGRESS NOTE ADULT - ASSESSMENT
33 y/o M with no significant known PMHx admitted for L thigh abscess, received surgical I&D with drain placement 02/20, found to have prolonged PT/ elevated INR with normal PTT.      # LLE Cellulitis with IM Abscess    - The patient has a hx of repetitive IM injections of exogenous testosterone, last use 1.5 - 2 years ago, now currently self injecting Vit B12 q 3-4 weeks in the same areas   - 'Spontaneous IM abscesses can be caused by immunodeficiency, trauma, drug injections, concurrent infection, and malnutrition. Local injection site infection and abscess extension into muscle tissue should not be confused with true pyomyositis which is caused by hematogenous seeding of muscle groups distant from injection sites'  - S/p I&D 02/20   - ID following; Continue Cefepime & Vanco; f/u cultures  - Continue supportive measures including IVF, antipyretics        # Transaminitis with Hepatomegaly     - Abdominal US imaging revealed hepatomegaly   - Lipid profile, lipase, amylase, hepatitis acute panel negative  - No known excessive EtOH consumption   - LFTs improved during admission, ALT still elevated   - Albumin low despite high calorie/protein diet   - Currently taking multiple supplements (creatine, glutathione, whey protein, collagen, apple cider vinegar, Vit B6 also Vit B12 injections) which could be contributory    - Recommend GI consult, may need advanced imaging studies      # Coagulopathy (Prolonged PT & Elevated INR) in setting of Sepsis & Hepatic Dysfunction    - S/p oral and IV Vit K  - Likely 2/2 hepatic dysfunction  - Continue to monitor serial coagulation  - Okay to give Lovenox during admission in addition to Venodynes and ambulation  - Factor VII- low at 21 %-factor VII likely due to deficiency in hepatic synthetic function - possible s/t hepatomegaly, abnormal LFT's, low albumin level- undiagnosed  liver disease due to intake of multiple OTC supplements     - D-Dimer & Fibrinogen- elevated, Fibrinogen is typically low, especially in acute DIC.    Addendum : Hem Onc attending  Patient seen and examined today morning together with Hem Onc PA  Isolated prolonged PT with normal PT  Differential diagnosis:  -  low factor VII due to deficiency in hepatic synthetic function - possible - has hepatomegaly, abnormal LFT's, low albumin level- undiagnosed  liver disease due to intake of multiple OTC supplements ??;   Will need GI evaluation   - hereditary factor VII deficiency ( but no family hx , no hx of prolonged bleeding after minor cuts; did not have any major hemostatic challenges / surgeries)  - vit K deficiency ( not very likely- no hx of prolonged antibiotics, no hx of ETOH/ obvious dietary deficiencies)  - acquired factor VII inhibitor- extremely rare  - rarely DIC can cause prolonged PT with normal PTT - check DIC panel.  -Factor VII activity- Low and elevated Fibrogen, and elevated D-Dimers  33 y/o M with no significant known PMHx admitted for L thigh abscess, received surgical I&D with drain placement 02/20, found to have prolonged PT/ elevated INR with normal PTT.    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  Vit K deficiency also can result in low factor VII-  but he has no reasons to be vit K deficient, received sufficient Vit K supplementation anyway.  Possible component of consumption due to low grade DIC ( ? elevated D dimer) Fibrinogen elevated due to acute phase reaction due to acute infection.    Will need follow up coags/ factor VII after infection resolves.       # LLE Cellulitis with IM Abscess  - The patient has a hx of repetitive IM injections of exogenous testosterone, last use 1.5 - 2 years ago, now currently self injecting Vit B12 q 3-4 weeks in the same areas   - 'Spontaneous IM abscesses can be caused by immunodeficiency, trauma, drug injections, concurrent infection, and malnutrition. Local injection site infection and abscess extension into muscle tissue should not be confused with true pyomyositis which is caused by hematogenous seeding of muscle groups distant from injection sites'  - S/p I&D 02/20   - ID following; Continue Cefepime & Vanco; f/u cultures  Management per medicine/ ID/ surgery      # Transaminitis with Hepatomegaly   - Abdominal US imaging revealed hepatomegaly   - Lipid profile, lipase, amylase, hepatitis acute panel negative  - No known excessive EtOH consumption   - LFTs improved during admission, ALT still elevated   - Albumin low despite high calorie/protein diet   - Currently taking multiple supplements (creatine, glutathione, whey protein, collagen, apple cider vinegar, Vit B6 also Vit B12 injections) which could be contributory    - Seen by GI- did  not feel hepatomegaly/ liver dysfunction significant.

## 2023-02-22 NOTE — PROGRESS NOTE ADULT - ASSESSMENT
HPI: 32M with no significant PMHx presented to [facility] on 02/18/2023 with left thigh abscess x2 weeks. Pt. reports subjective fevers for the past 2 days. Pt. was seen last week at urgent care and was initially treated with Clinda x3 days, though without improvement. He saw urgent care again and was told to stop taking antibiotic because it was likely a muscle strain. Pt. has since noted increased firmness, no spontaneous drainage, or drainage procedures. He reports a Vitamin B12 injection to the left thigh 3 weeks prior. Pt endorses taking Ibuprofen without improvement.     Sepsis, POA, due to LLE cellulitis with intramuscular abscess s/p drain placement on 2/20   - As per surgery   - 2/20 drainage of left thigh abscess, 350cc drained during procedure  - C/W IV abx - cefepime and vancomycin  - F/U cultures; abscess culture pending; blood cultures negative  - C/W IV hydration, antipyretics  - CT scan demonstrated abscess decreased in size and that drain is in correct location    Transaminitis resolving, likely adverse drug effect, multiple supplements   Hepatomegaly  Elevated B12 - advised to stop  - Lipid profile, lipase, amylase, hepatitis acute panel negative, TSH WDL  - US abdomen demonstrating hepatomegaly  - On multiple supplements - creatinine, glutathione, L-arginine, whey protein, collagen, apple cider vinegar, injections of B12, B6 vitamins  - High vitamin B12, low vit-D; will order vit-d supplement  - A1C 5.7  - B6 - pending  - GI consult Dr. Jain - no further work-up needed , outpatient follow-up, hepatomegaly mild    Coagulopathy due to sepsis and liver dysfunction, ?DIC due to sepsis  Elevated d-dimers s/p I&D can be falsely elevated due to surgical intervention and sepsis  - S/P IV vitamin K , S/P 10 mg vit K PO, it may not help because cause of coagulopathy likely due to sepsis  - As per hematology, monitor DIC labs - Fibrinogen 1335, D-dimer 1200, PT 21.9, INR 1.88, Factor VII 21  - Doppler LE - no DVT  - Low suspicion for PE  - no dyspnea, , no hypoxia, no chest pain    Chest pain  resolved, due to GERD   - Maalox and TUMS PRN  - EKG reviewed - some T wave inversion  - Troponin negative,   - CXR - clear, no acute findings    Hyperglycemia  - A1C 5.7    Vitamin D deficiency  - Will replace     DVT PPX:  - As per surgery    Disposition: as per surgery    Time Span: 75 min     Thank you for consult, will follow with you. HPI: 32M with no significant PMHx presented to [facility] on 02/18/2023 with left thigh abscess x2 weeks. Pt. reports subjective fevers for the past 2 days. Pt. was seen last week at urgent care and was initially treated with Clinda x3 days, though without improvement. He saw urgent care again and was told to stop taking antibiotic because it was likely a muscle strain. Pt. has since noted increased firmness, no spontaneous drainage, or drainage procedures. He reports a Vitamin B12 injection to the left thigh 3 weeks prior. Pt endorses taking Ibuprofen without improvement.     Sepsis, POA, due to LLE cellulitis with intramuscular abscess s/p drain placement on 2/20   - As per surgery   - 2/20 drainage of left thigh abscess, 350cc drained during procedure  - C/W IV abx - cefepime and vancomycin  - F/U cultures; abscess culture pending; blood cultures negative  - C/W IV hydration, antipyretics  - CT scan demonstrated abscess decreased in size and that drain is in correct location    Transaminitis resolving, likely adverse drug effect, multiple supplements   Hepatomegaly  Elevated B12 - advised to stop  - Lipid profile, lipase, amylase, hepatitis acute panel negative, TSH WDL  - US abdomen demonstrating hepatomegaly  - On multiple supplements - creatinine, glutathione, L-arginine, whey protein, collagen, apple cider vinegar, injections of B12, B6 vitamins  - High vitamin B12, low vit-D; will order vit-d supplement  - A1C 5.7  - B6 - pending  - GI consult Dr. 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  Elevated d-dimers s/p I&D can be falsely elevated due to surgical intervention and sepsis  - S/P IV vitamin K , S/P 10 mg vit K PO, it may not help because cause of coagulopathy likely due to sepsis  - As per hematology, monitor DIC labs - Fibrinogen 1335, D-dimer 1200, PT 21.9, INR 1.88, Factor VII 21, o/p follow up   - Doppler LE - no DVT  - Low suspicion for PE  - no dyspnea, , no hypoxia, no chest pain    Chest pain  resolved, due to GERD   - Maalox and TUMS PRN  - EKG reviewed - some T wave inversion  - Troponin negative,   - CXR - clear, no acute findings    Hyperglycemia  - A1C 5.7    Vitamin D deficiency  - Will replace     DVT PPX:  - As per surgery    Disposition: as per surgery    Time Span: 75 min     Thank you for consult, will follow with you.

## 2023-02-22 NOTE — PROGRESS NOTE ADULT - SUBJECTIVE AND OBJECTIVE BOX
HPI:    31 y/o M otherwise healthy who presented to the ED with L thigh abscess x2 weeks. He admitted that he initially felt pain with localized swelling which slowly progressed and then developed fever/chills. At that point he went to a local urgent care, given Clindamycin which he took for about 3 days, symptoms did not improve, went back to the urgent care, received a xray that did not show anything acute, advised that he likely had a muscle strain. He noted that the symptoms continued to worsen to the point that he came to the ED. He admitted that he is a competing  and takes various amounts of supplements currently including creatine, whey protein, arginine, Vit B6 and Vit B12 injections. He admitted that he has used exogenous testosterone in the past which he has injected into his shoulders/thighs/gluts, but has not used in about 1.5 - 2 years. He admitted the most recent Vit B12 injection was about 3-4 weeks ago before the L thigh symptoms started. He is otherwise well currently and denies any cough, HA, vision/hearing changes, palpitations, SOB, CANSECO, wheezing, abdominal pain, nausea, vomiting, diarrhea, melena, BRBPR, dysuria, hematuria or any other acute c/o.      2023: Seen at bedside, resting, no acute distress    2023- seen at bedside, sleeping, but arousal in no acute idtress      PAST MEDICAL & SURGICAL HISTORY:    No significant past surgical history        MEDICATIONS  (STANDING):    cefepime   IVPB      cefepime   IVPB 2000 milliGRAM(s) IV Intermittent every 12 hours  cholecalciferol 2000 Unit(s) Oral at bedtime  enoxaparin Injectable 40 milliGRAM(s) SubCutaneous every 24 hours  lactated ringers. 1000 milliLiter(s) (100 mL/Hr) IV Continuous <Continuous>  vancomycin  IVPB 1500 milliGRAM(s) IV Intermittent every 8 hours      MEDICATIONS  (PRN):    acetaminophen     Tablet .. 650 milliGRAM(s) Oral every 6 hours PRN Temp greater or equal to 38C (100.4F), Mild Pain (1 - 3)  aluminum hydroxide/magnesium hydroxide/simethicone Suspension 30 milliLiter(s) Oral every 4 hours PRN Dyspepsia  bisacodyl 5 milliGRAM(s) Oral daily PRN Constipation  bisacodyl Suppository 10 milliGRAM(s) Rectal once PRN Constipation  calcium carbonate    500 mG (Tums) Chewable 3 Tablet(s) Chew every 6 hours PRN Dyspepsia  HYDROmorphone  Injectable 1 milliGRAM(s) IV Push every 4 hours PRN Severe Pain (7 - 10)  metoclopramide Injectable 10 milliGRAM(s) IV Push every 6 hours PRN Nausea and/or Vomiting not relieved by Zofran  ondansetron Injectable 4 milliGRAM(s) IV Push every 6 hours PRN Nausea  oxyCODONE    IR 10 milliGRAM(s) Oral every 4 hours PRN Moderate Pain (4 - 6)      ALLERGIES:    penicillin (Rash (Mild))        FAMILY HISTORY:    Nothing to note      REVIEW OF SYSTEMS:    Constitutional, Eyes, ENT, Cardiovascular, Respiratory, Gastrointestinal, Genitourinary, Musculoskeletal, Integumentary, Neurological, Psychiatric, Endocrine, Heme/Lymph and Allergic/Immunologic review of systems are otherwise negative except as noted in HPI.       VITALS:    Vital Signs Last 24 Hrs  T(C): 36.8 (2023 16:11), Max: 37.4 (2023 21:50)  T(F): 98.2 (2023 16:11), Max: 99.4 (2023 21:50)  HR: 78 (2023 16:11) (76 - 97)  BP: 130/61 (2023 16:11) (118/57 - 130/61)  BP(mean): 68 (2023 18:23) (68 - 68)  RR: 18 (2023 16:11) (16 - 18)  SpO2: 99% (2023 16:11) (97% - 99%)    Parameters below as of 2023 16:11  Patient On (Oxygen Delivery Method): room air    PHYSICAL:    Constitutional: no acute distress  Eyes:  no conjunctival infection, anicteric  ENT: pharynx is unremarkable, moist mucus membrane, no oral lesions  Neck: supple without JVD   Pulmonary: clear to auscultation bilaterally, no dullness, no wheezing  Cardiac: RRR, normal S1S2  Vascular: no JVD, no calf tenderness, venous stasis changes, varices  Abdomen: normoactive bowel sounds, soft and nontender, no hepatosplenomegaly or masses appreciated  Lymphatic: no peripheral adenopathy appreciated  Musculoskeletal: full range of motion and no deformities appreciated x 4 extremities;  L thigh with swelling, dressing on IM drain in place L anterior mid thigh region with serosanguinous fluid collection in bag  Skin: normal appearance overall, no vesicles, rashes, lesions  Neurology: grossly intact  Psychiatric: affect/mood appropriate      LABS:    CBC Full  -  ( 2023 06:30 )  WBC Count : 8.49 K/uL  RBC Count : 3.89 M/uL  Hemoglobin : 10.9 g/dL  Hematocrit : 32.3 %  Platelet Count - Automated : 424 K/uL  Mean Cell Volume : 83.0 fl  Mean Cell Hemoglobin : 28.0 pg  Mean Cell Hemoglobin Concentration : 33.7 gm/dL  Auto Neutrophil # : 6.28 K/uL  Auto Lymphocyte # : 1.15 K/uL  Auto Monocyte # : 0.69 K/uL  Auto Eosinophil # : 0.19 K/uL  Auto Basophil # : 0.02 K/uL  Auto Neutrophil % : 74.1 %  Auto Lymphocyte % : 13.5 %  Auto Monocyte % : 8.1 %  Auto Eosinophil % : 2.2 %  Auto Basophil % : 0.2 %          137  |  101  |  14  ----------------------------<  91  4.0   |  32<H>  |  0.90    Ca    8.8      2023 06:30  Phos  3.7       Mg     2.0         TPro  6.5  /  Alb  2.1<L>  /  TBili  0.4  /  DBili  x   /  AST  30  /  ALT  103<H>  /  AlkPhos  34<L>      PT/INR - ( 2023 05:58 )   PT: 21.9 sec;   INR: 1.88 ratio        Urinalysis Basic - ( 2023 23:27 )    Color: Yellow / Appearance: Clear / S.015 / pH: x  Gluc: x / Ketone: Negative  / Bili: Negative / Urobili: 8   Blood: x / Protein: Negative / Nitrite: Negative   Leuk Esterase: Negative / RBC: x / WBC x   Sq Epi: x / Non Sq Epi: x / Bacteria: x        RADIOLOGY & ADDITIONAL STUDIES:    CT Lower Extremity w/ IV Cont, Left (23 @ 13:26)      IMPRESSION:    Large peripherally enhancing complex appearing fluid collection at the   lateral aspect of the vastus lateralis muscle at the level the proximal   third of the femur consistent with an intramuscular abscess. Adjacent   soft tissue swelling compatible with cellulitis.        Xray Chest 1 View- PORTABLE-Urgent (Xray Chest 1 View- PORTABLE-Urgent .) (23 @ 22:53)    IMPRESSION: No acute finding.        US Abdomen Complete (US Abdomen Complete .) (23 @ 07:52)    IMPRESSION:  Hepatomegaly.    Echogenic kidneys which can be seen with medical renal disease. Trace   right perinephric fluid.       HPI:    31 y/o M otherwise healthy who presented to the ED with L thigh abscess x2 weeks. He admitted that he initially felt pain with localized swelling which slowly progressed and then developed fever/chills. At that point he went to a local urgent care, given Clindamycin which he took for about 3 days, symptoms did not improve, went back to the urgent care, received a xray that did not show anything acute, advised that he likely had a muscle strain. He noted that the symptoms continued to worsen to the point that he came to the ED. He admitted that he is a competing  and takes various amounts of supplements currently including creatine, whey protein, arginine, Vit B6 and Vit B12 injections. He admitted that he has used exogenous testosterone in the past which he has injected into his shoulders/thighs/gluts, but has not used in about 1.5 - 2 years. He admitted the most recent Vit B12 injection was about 3-4 weeks ago before the L thigh symptoms started. He is otherwise well currently and denies any cough, HA, vision/hearing changes, palpitations, SOB, CANSECO, wheezing, abdominal pain, nausea, vomiting, diarrhea, melena, BRBPR, dysuria, hematuria or any other acute c/o.      02/21/2023: Seen at bedside, resting, no acute distress    02/22/2023- seen at bedside, sleeping, but wakes up in no acute idtress      PAST MEDICAL & SURGICAL HISTORY:    No significant past surgical history        MEDICATIONS  (STANDING):    cefepime   IVPB      cefepime   IVPB 2000 milliGRAM(s) IV Intermittent every 12 hours  cholecalciferol 2000 Unit(s) Oral at bedtime  enoxaparin Injectable 40 milliGRAM(s) SubCutaneous every 24 hours  lactated ringers. 1000 milliLiter(s) (100 mL/Hr) IV Continuous <Continuous>  vancomycin  IVPB 1500 milliGRAM(s) IV Intermittent every 8 hours      MEDICATIONS  (PRN):    acetaminophen     Tablet .. 650 milliGRAM(s) Oral every 6 hours PRN Temp greater or equal to 38C (100.4F), Mild Pain (1 - 3)  aluminum hydroxide/magnesium hydroxide/simethicone Suspension 30 milliLiter(s) Oral every 4 hours PRN Dyspepsia  bisacodyl 5 milliGRAM(s) Oral daily PRN Constipation  bisacodyl Suppository 10 milliGRAM(s) Rectal once PRN Constipation  calcium carbonate    500 mG (Tums) Chewable 3 Tablet(s) Chew every 6 hours PRN Dyspepsia  HYDROmorphone  Injectable 1 milliGRAM(s) IV Push every 4 hours PRN Severe Pain (7 - 10)  metoclopramide Injectable 10 milliGRAM(s) IV Push every 6 hours PRN Nausea and/or Vomiting not relieved by Zofran  ondansetron Injectable 4 milliGRAM(s) IV Push every 6 hours PRN Nausea  oxyCODONE    IR 10 milliGRAM(s) Oral every 4 hours PRN Moderate Pain (4 - 6)      ALLERGIES:    penicillin (Rash (Mild))        FAMILY HISTORY:    Nothing to note      REVIEW OF SYSTEMS:    Constitutional, Eyes, ENT, Cardiovascular, Respiratory, Gastrointestinal, Genitourinary, Musculoskeletal, Integumentary, Neurological, Psychiatric, Endocrine, Heme/Lymph and Allergic/Immunologic review of systems are otherwise negative except as noted in HPI.       VITALS:    Vital Signs Last 24 Hrs  T(C): 36.8 (22 Feb 2023 16:11), Max: 37.4 (21 Feb 2023 21:50)  T(F): 98.2 (22 Feb 2023 16:11), Max: 99.4 (21 Feb 2023 21:50)  HR: 78 (22 Feb 2023 16:11) (76 - 97)  BP: 130/61 (22 Feb 2023 16:11) (118/57 - 130/61)  BP(mean): 68 (21 Feb 2023 18:23) (68 - 68)  RR: 18 (22 Feb 2023 16:11) (16 - 18)  SpO2: 99% (22 Feb 2023 16:11) (97% - 99%)    Parameters below as of 22 Feb 2023 16:11  Patient On (Oxygen Delivery Method): room air    PHYSICAL:    Constitutional: no acute distress  Eyes:  no conjunctival infection, anicteric  ENT: pharynx is unremarkable, moist mucus membrane, no oral lesions  Neck: supple without JVD   Pulmonary: clear to auscultation bilaterally, no dullness, no wheezing  Cardiac: RRR, normal S1S2  Vascular: no JVD, no calf tenderness, venous stasis changes, varices  Abdomen: normoactive bowel sounds, soft and nontender, no hepatosplenomegaly or masses appreciated  Lymphatic: no peripheral adenopathy appreciated  Musculoskeletal: full range of motion and no deformities appreciated x 4 extremities;  L thigh with swelling, dressing on IM drain in place L anterior mid thigh region with serosanguinous fluid collection in bag  Skin: normal appearance overall, no vesicles, rashes, lesions  Neurology: grossly intact  Psychiatric: affect/mood appropriate      LABS:    CBC Full  -  ( 22 Feb 2023 06:30 )  WBC Count : 8.49 K/uL  RBC Count : 3.89 M/uL  Hemoglobin : 10.9 g/dL  Hematocrit : 32.3 %  Platelet Count - Automated : 424 K/uL  Mean Cell Volume : 83.0 fl  Mean Cell Hemoglobin : 28.0 pg  Mean Cell Hemoglobin Concentration : 33.7 gm/dL  Auto Neutrophil # : 6.28 K/uL  Auto Lymphocyte # : 1.15 K/uL  Auto Monocyte # : 0.69 K/uL  Auto Eosinophil # : 0.19 K/uL  Auto Basophil # : 0.02 K/uL  Auto Neutrophil % : 74.1 %  Auto Lymphocyte % : 13.5 %  Auto Monocyte % : 8.1 %  Auto Eosinophil % : 2.2 %  Auto Basophil % : 0.2 %      02-22    137  |  101  |  14  ----------------------------<  91  4.0   |  32<H>  |  0.90    Ca    8.8      22 Feb 2023 06:30  Phos  3.7     02-21  Mg     2.0     02-21    TPro  6.5  /  Alb  2.1<L>  /  TBili  0.4  /  DBili  x   /  AST  30  /  ALT  103<H>  /  AlkPhos  34<L>  02-22    PT/INR - ( 21 Feb 2023 05:58 )   PT: 21.9 sec;   INR: 1.88 ratio      D Dimer 1200  Fibrinogen 1335     Bilat LE venous Doppler - no DVT     Factor VII activity 21 % ( low- N : 50- 165 %)         RADIOLOGY & ADDITIONAL STUDIES:    CT Lower Extremity w/ IV Cont, Left (02.18.23 @ 13:26)      IMPRESSION:    Large peripherally enhancing complex appearing fluid collection at the   lateral aspect of the vastus lateralis muscle at the level the proximal   third of the femur consistent with an intramuscular abscess. Adjacent   soft tissue swelling compatible with cellulitis.        Xray Chest 1 View- PORTABLE-Urgent (Xray Chest 1 View- PORTABLE-Urgent .) (02.19.23 @ 22:53)    IMPRESSION: No acute finding.        US Abdomen Complete (US Abdomen Complete .) (02.20.23 @ 07:52)    IMPRESSION:  Hepatomegaly.    Echogenic kidneys which can be seen with medical renal disease. Trace   right perinephric fluid.

## 2023-02-22 NOTE — PROGRESS NOTE ADULT - SUBJECTIVE AND OBJECTIVE BOX
Chief complain:  Left thigh pain     HPI: 32M with no significant PMHx presented to [facility] on 2023 with left thigh abscess x2 weeks. Pt. reports subjective fevers for the past 2 days. Pt. was seen last week at urgent care and was initially treated with Clinda x3 days, though without improvement. He saw urgent care again and was told to stop taking antibiotic because it was likely a muscle strain. Pt. has since noted increased firmness, no spontaneous drainage, or drainage procedures. He reports a Vitamin B12 injection to the left thigh 3 weeks prior. Pt endorses taking Ibuprofen without improvement.    Hospital Course:   - drainage of left thigh abscess; 350cc drained during procedure    Interval History:   - Patient seen and examined at bedside earlier today, febrile, pain in the leg persist, percocet 5 mg not effective, events noted overnight, chest tightness, denies cough, cp, dyspnea now, denies abdominal pain, tolerating po diet, plan discussed.   - Pt. seen and examined at bedside earlier today, feels better, less pain in the leg, + febrile, tolerating IV abx   - Pt. seen and examined at bedside earlier today. Feeling much better, no more fevers and no longer requiring pain medications. Continues to tolerate IV antibiotics. Denies CP, SOB, cough, or abdominal pain.    Review of system - Rest of the review of system are negative except mentioned in HPI.    Vital Signs Last 24 Hrs  T(C): 37 (2023 11:16), Max: 37.4 (2023 21:50)  T(F): 98.6 (2023 11:16), Max: 99.4 (2023 21:50)  HR: 82 (2023 11:16) (71 - 97)  BP: 128/61 (2023 11:16) (118/57 - 128/61)  BP(mean): 68 (2023 18:23) (68 - 68)  RR: 18 (2023 11:16) (16 - 18)  SpO2: 98% (2023 11:16) (97% - 98%)  Parameters below as of 2023 11:16  Patient On (Oxygen Delivery Method): room air    Physical exam:   General : NAD, appear to be of stated age, well groomed   NERVOUS SYSTEM:  A&Ox3, non-focal exam, motor Strength 5/5 bialteral upper and lower extremities; DTRs 2+ intact and symmetric  HEAD:  Atraumatic, Normocephalic  EYES: EOMI, PERRLA, conjunctiva and sclera clear  HEENT: Moist mucous membranes, supple neck, no JVD  CHEST: Clear to auscultation bilaterally; No rales, no rhonchi, no wheezing  HEART: Regular rate and rhythm; No murmurs, no rubs or gallops  ABDOMEN: Soft, non-tender, Non-distended; Bowel sounds present, no guarding , no peritoneal irritation   GENITOURINARY: Voiding, no suprapubic tenderness  EXTREMITIES:  2+ Peripheral Pulses, No clubbing, cyanosis, edema  MUSCULOSKELETAL: No muscle tenderness, muscle tone normal, no joint tenderness or swelling noted; FROM  SKIN: No rash, Left thigh drain in place with serosanguinous discharge     Labs radiologic and other test: all reviewed and interpret       137  |  101  |  14  ----------------------------<  91  4.0   |  32<H>  |  0.90    Ca    8.8      2023 06:30  Phos  3.7       Mg     2.0         TPro  6.5  /  Alb  2.1<L>  /  TBili  0.4  /  DBili  x   /  AST  30  /  ALT  103<H>  /  AlkPhos  34<L>                 10.9   8.49  )-----------( 424      ( 2023 06:30 )             32.3     LIVER FUNCTIONS - ( 2023 06:30 )  Alb: 2.1 g/dL / Pro: 6.5 gm/dL / ALK PHOS: 34 U/L / ALT: 103 U/L / AST: 30 U/L / GGT: x           PT/INR - ( 2023 05:58 )   PT: 21.9 sec;   INR: 1.88 ratio      Urinalysis Basic - ( 2023 23:27 )  Color: Yellow / Appearance: Clear / S.015 / pH: x  Gluc: x / Ketone: Negative  / Bili: Negative / Urobili: 8   Blood: x / Protein: Negative / Nitrite: Negative   Leuk Esterase: Negative / RBC: x / WBC x   Sq Epi: x / Non Sq Epi: x / Bacteria: x    Acute Hepatitis Panel (23 @ 20:26)  Hepatitis C Virus Interpretation: Nonreact: Hepatitis C AB  S/CO Ratio                        Interpretation  < 1.00                                   Non-Reactive  1.00 - 4.99                         Weakly-Reactive  >= 5.00                                Reactive  Non-Reactive: A person witha non-reactive HCV antibody result is  considered uninfected.  No further action is needed unless recent  infection is suspected.  In these cases, consider repeat testing later to  detect seroconversion..  Weakly-Reactive: HCV antibody test is abnormal, HCV RNA Qualitative test  will follow.  Reactive: HCV antibody test is abnormal, HCV RNA Qualitative test will  follow.  Note: HCV antibody testing is performed on the [employer]  system.    Hepatitis C Virus S/CO Ratio: 0.53 S/CO    Hepatitis B Core IgM Antibody: Nonreact    Hepatitis B Surface Antigen: Nonreact    Hepatitis A IgM Antibody: Nonreact  Ferritin, Serum (23 @ 20:26)    Ferritin, Serum: 1351 ng/mL    Cortisol AM, Serum . (23 @ 07:14)    Cortisol AM, Serum: 12.2 ug/dL    Iron with Total Binding Capacity (23 @ 20:26)    Iron - Total Binding Capacity.: 192 ug/dL    % Saturation, Iron: 10 %    Iron Total, Serum: 19 ug/dL    Unsaturated Iron Binding Capacity: 173 ug/dL    Vitamin B12, Serum (23 @ 20:26)    Vitamin B12, Serum: 1515 pg/mL    Vitamin D, 25-Hydroxy (23 @ 20:26)    Vitamin D, 25-Hydroxy: 17.8:     RECENT CULTURES:  Culture - Blood (23 @ 12:36)    Specimen Source: .Blood None    Culture Results:   No growth to date.    Culture - Blood (23 @ 12:36)    Specimen Source: .Blood None    Culture Results:   No growth to date.    Cardiac testing: reviewed   EKG   12 Lead ECG (23 @ 11:53) >  Ventricular Rate 76 BPM  QTC Calculation(Bazett) 402 ms  Diagnosis Line Normal sinus rhythm  Nonspecific T wave abnormality  No previous ECGs available    US Abdomen Complete (US Abdomen Complete .) (23 @ 07:52) >  FINDINGS:  Liver: Enlarged, measuring 21 cm in length..  Bile ducts: Normal caliber. Common bile duct measures 2 mm.  Gallbladder: Within normal limits.  Pancreas: Visualized portions are within normal limits.  Spleen: 12.7 cm. Within normal limits.  Right kidney: 12.4 cm. No hydronephrosis. Echogenic kidney. Trace   perinephric fluid.  Left kidney: 13.8 cm. No hydronephrosis. Echogenic kidney.  Aorta and IVC: Visualized portions are within normal limits.  IMPRESSION:  Hepatomegaly.  Echogenic kidneys which can be seen with medical renal disease. Trace   right perinephric fluid.     CT Lower Extremity w/ IV Cont, Left (23 @ 13:26) >  Large peripherally enhancing complex appearing fluid collection at the   lateral aspect of the vastus lateralis muscle at the level the proximal   third of the femur consistent with an intramuscular abscess. Adjacent   soft tissue swelling compatible with cellulitis.    Xray Chest 1 View- PORTABLE-Urgent (Xray Chest 1 View- PORTABLE-Urgent .) (23 @ 22:53)  ACC: [id number] EXAM:  XR CHEST PORTABLE URGENT 1V   ORDERED BY: DAJUAN FERNANDEZ   PROCEDURE DATE:  2023    INTERPRETATION:  AP chest on 2023 at 10:43 PM. Patient has   chest pressure and fever.  COMPARISON: None available.  Heart magnified by technique.  Lungs are clear.  IMPRESSION: No acute finding.  --- End of Report ---    Home Medications:  ascorbic acid 500 mg oral tablet: 1 tab(s) orally once a day (2023 17:10)  ibuprofen 200 mg oral capsule: 1 cap(s) orally every 6 hours, As Needed (2023 17:10)    MEDICATIONS  (STANDING):  cefepime   IVPB      cefepime   IVPB 2000 milliGRAM(s) IV Intermittent every 12 hours  cholecalciferol 2000 Unit(s) Oral at bedtime  enoxaparin Injectable 40 milliGRAM(s) SubCutaneous every 24 hours  lactated ringers. 1000 milliLiter(s) (100 mL/Hr) IV Continuous <Continuous>  vancomycin  IVPB 1500 milliGRAM(s) IV Intermittent every 8 hours    MEDICATIONS  (PRN):  acetaminophen     Tablet .. 650 milliGRAM(s) Oral every 6 hours PRN Temp greater or equal to 38C (100.4F), Mild Pain (1 - 3)  aluminum hydroxide/magnesium hydroxide/simethicone Suspension 30 milliLiter(s) Oral every 4 hours PRN Dyspepsia  bisacodyl 5 milliGRAM(s) Oral daily PRN Constipation  bisacodyl Suppository 10 milliGRAM(s) Rectal once PRN Constipation  calcium carbonate    500 mG (Tums) Chewable 3 Tablet(s) Chew every 6 hours PRN Dyspepsia  HYDROmorphone  Injectable 1 milliGRAM(s) IV Push every 4 hours PRN Severe Pain (7 - 10)  metoclopramide Injectable 10 milliGRAM(s) IV Push every 6 hours PRN Nausea and/or Vomiting not relieved by Zofran  ondansetron Injectable 4 milliGRAM(s) IV Push every 6 hours PRN Nausea  oxyCODONE    IR 10 milliGRAM(s) Oral every 4 hours PRN Moderate Pain (4 - 6)

## 2023-02-23 LAB
ALBUMIN SERPL ELPH-MCNC: 2.2 G/DL — LOW (ref 3.3–5)
ALP SERPL-CCNC: 34 U/L — LOW (ref 40–120)
ALT FLD-CCNC: 114 U/L — HIGH (ref 12–78)
ANION GAP SERPL CALC-SCNC: 2 MMOL/L — LOW (ref 5–17)
APTT BLD: 29.6 SEC — SIGNIFICANT CHANGE UP (ref 27.5–35.5)
AST SERPL-CCNC: 37 U/L — SIGNIFICANT CHANGE UP (ref 15–37)
BILIRUB SERPL-MCNC: 0.4 MG/DL — SIGNIFICANT CHANGE UP (ref 0.2–1.2)
BUN SERPL-MCNC: 17 MG/DL — SIGNIFICANT CHANGE UP (ref 7–23)
CALCIUM SERPL-MCNC: 8.9 MG/DL — SIGNIFICANT CHANGE UP (ref 8.5–10.1)
CHLORIDE SERPL-SCNC: 103 MMOL/L — SIGNIFICANT CHANGE UP (ref 96–108)
CO2 SERPL-SCNC: 32 MMOL/L — HIGH (ref 22–31)
CREAT SERPL-MCNC: 0.89 MG/DL — SIGNIFICANT CHANGE UP (ref 0.5–1.3)
CULTURE RESULTS: SIGNIFICANT CHANGE UP
CULTURE RESULTS: SIGNIFICANT CHANGE UP
EGFR: 118 ML/MIN/1.73M2 — SIGNIFICANT CHANGE UP
GLUCOSE SERPL-MCNC: 100 MG/DL — HIGH (ref 70–99)
HCT VFR BLD CALC: 33 % — LOW (ref 39–50)
HGB BLD-MCNC: 11 G/DL — LOW (ref 13–17)
INR BLD: 1.55 RATIO — HIGH (ref 0.88–1.16)
MCHC RBC-ENTMCNC: 27.9 PG — SIGNIFICANT CHANGE UP (ref 27–34)
MCHC RBC-ENTMCNC: 33.3 GM/DL — SIGNIFICANT CHANGE UP (ref 32–36)
MCV RBC AUTO: 83.8 FL — SIGNIFICANT CHANGE UP (ref 80–100)
PLATELET # BLD AUTO: 451 K/UL — HIGH (ref 150–400)
POTASSIUM SERPL-MCNC: 4.3 MMOL/L — SIGNIFICANT CHANGE UP (ref 3.5–5.3)
POTASSIUM SERPL-SCNC: 4.3 MMOL/L — SIGNIFICANT CHANGE UP (ref 3.5–5.3)
PROT SERPL-MCNC: 6.5 GM/DL — SIGNIFICANT CHANGE UP (ref 6–8.3)
PROTHROM AB SERPL-ACNC: 18.1 SEC — HIGH (ref 10.5–13.4)
RBC # BLD: 3.94 M/UL — LOW (ref 4.2–5.8)
RBC # FLD: 14.5 % — SIGNIFICANT CHANGE UP (ref 10.3–14.5)
SODIUM SERPL-SCNC: 137 MMOL/L — SIGNIFICANT CHANGE UP (ref 135–145)
SPECIMEN SOURCE: SIGNIFICANT CHANGE UP
SPECIMEN SOURCE: SIGNIFICANT CHANGE UP
VIT B1 SERPL-MCNC: 76 NMOL/L — SIGNIFICANT CHANGE UP (ref 66.5–200)
WBC # BLD: 6.9 K/UL — SIGNIFICANT CHANGE UP (ref 3.8–10.5)
WBC # FLD AUTO: 6.9 K/UL — SIGNIFICANT CHANGE UP (ref 3.8–10.5)

## 2023-02-23 PROCEDURE — 99232 SBSQ HOSP IP/OBS MODERATE 35: CPT

## 2023-02-23 RX ORDER — OXYCODONE HYDROCHLORIDE 5 MG/1
5 TABLET ORAL EVERY 6 HOURS
Refills: 0 | Status: DISCONTINUED | OUTPATIENT
Start: 2023-02-23 | End: 2023-02-24

## 2023-02-23 RX ORDER — HYDROMORPHONE HYDROCHLORIDE 2 MG/ML
0.5 INJECTION INTRAMUSCULAR; INTRAVENOUS; SUBCUTANEOUS EVERY 4 HOURS
Refills: 0 | Status: DISCONTINUED | OUTPATIENT
Start: 2023-02-23 | End: 2023-02-24

## 2023-02-23 RX ADMIN — Medication 2000 UNIT(S): at 22:02

## 2023-02-23 RX ADMIN — CEFEPIME 100 MILLIGRAM(S): 1 INJECTION, POWDER, FOR SOLUTION INTRAMUSCULAR; INTRAVENOUS at 05:36

## 2023-02-23 RX ADMIN — SODIUM CHLORIDE 100 MILLILITER(S): 9 INJECTION, SOLUTION INTRAVENOUS at 00:25

## 2023-02-23 RX ADMIN — Medication 100 MILLIGRAM(S): at 13:37

## 2023-02-23 RX ADMIN — Medication 300 MILLIGRAM(S): at 06:11

## 2023-02-23 RX ADMIN — Medication 650 MILLIGRAM(S): at 00:39

## 2023-02-23 RX ADMIN — Medication 100 MILLIGRAM(S): at 22:01

## 2023-02-23 NOTE — PROGRESS NOTE ADULT - SUBJECTIVE AND OBJECTIVE BOX
Chief complain:  Left thigh pain     HPI: 32M with no significant PMHx presented to [facility] on 2023 with left thigh abscess x2 weeks. Pt. reports subjective fevers for the past 2 days. Pt. was seen last week at urgent care and was initially treated with Clinda x3 days, though without improvement. He saw urgent care again and was told to stop taking antibiotic because it was likely a muscle strain. Pt. has since noted increased firmness, no spontaneous drainage, or drainage procedures. He reports a Vitamin B12 injection to the left thigh 3 weeks prior. Pt endorses taking Ibuprofen without improvement.    Hospital Course:   - drainage of left thigh abscess; 350cc drained during procedure    Interval History:   - Patient seen and examined at bedside earlier today, febrile, pain in the leg persist, percocet 5 mg not effective, events noted overnight, chest tightness, denies cough, cp, dyspnea now, denies abdominal pain, tolerating po diet, plan discussed.   - Pt. seen and examined at bedside earlier today, feels better, less pain in the leg, + febrile, tolerating IV abx   - Pt. seen and examined at bedside earlier today. Feeling much better, no more fevers and no longer requiring pain medications. Continues to tolerate IV antibiotics. Denies CP, SOB, cough, or abdominal pain.   - Pt. seen and examined at bedside earlier today. Feels weak but better, no fevers, pain is well-controlled without medication. Continues to tolerate IV antibiotics. Denies CP, SOB, cough, or abdominal pain at this time.    Review of system - Rest of the review of system are negative except mentioned in HPI.    Vital Signs Last 24 Hrs  T(C): 36.9 (2023 08:30), Max: 36.9 (2023 23:14)  T(F): 98.4 (2023 08:30), Max: 98.4 (2023 23:14)  HR: 70 (2023 08:30) (70 - 78)  BP: 120/73 (2023 08:30) (120/73 - 130/61)  RR: 18 (2023 08:30) (18 - 18)  SpO2: 99% (2023 08:30) (99% - 99%)  Parameters below as of 2023 08:30  Patient On (Oxygen Delivery Method): room air    Physical exam:   General : NAD, appear to be of stated age, well groomed   NERVOUS SYSTEM:  A&Ox3, non-focal exam, motor Strength 5/5 bialteral upper and lower extremities; DTRs 2+ intact and symmetric  HEAD:  Atraumatic, Normocephalic  EYES: EOMI, PERRLA, conjunctiva and sclera clear  HEENT: Moist mucous membranes, supple neck, no JVD  CHEST: Clear to auscultation bilaterally; No rales, no rhonchi, no wheezing  HEART: Regular rate and rhythm; No murmurs, no rubs or gallops  ABDOMEN: Soft, non-tender, Non-distended; Bowel sounds present, no guarding , no peritoneal irritation   GENITOURINARY: Voiding, no suprapubic tenderness  EXTREMITIES:  2+ Peripheral Pulses, No clubbing, cyanosis, edema  MUSCULOSKELETAL: No muscle tenderness, muscle tone normal, no joint tenderness or swelling noted; FROM  SKIN: No rash, Left thigh drain in place with serosanguinous discharge     Labs radiologic and other test: all reviewed and interpret       137  |  103  |  17  ----------------------------<  100<H>  4.3   |  32<H>  |  0.89    Ca    8.9      2023 06:34    TPro  6.5  /  Alb  2.2<L>  /  TBili  0.4  /  DBili  x   /  AST  37  /  ALT  114<H>  /  AlkPhos  34<L>                 11.0   6.90  )-----------( 451      ( 2023 06:34 )             33.0     LIVER FUNCTIONS - ( 2023 06:34 )  Alb: 2.2 g/dL / Pro: 6.5 gm/dL / ALK PHOS: 34 U/L / ALT: 114 U/L / AST: 37 U/L / GGT: x           PT/INR - ( 2023 06:34 )   PT: 18.1 sec;   INR: 1.55 ratio      PTT - ( 2023 06:34 )  PTT:29.6 sec    Urinalysis Basic - ( 2023 23:27 )  Color: Yellow / Appearance: Clear / S.015 / pH: x  Gluc: x / Ketone: Negative  / Bili: Negative / Urobili: 8   Blood: x / Protein: Negative / Nitrite: Negative   Leuk Esterase: Negative / RBC: x / WBC x   Sq Epi: x / Non Sq Epi: x / Bacteria: x    Acute Hepatitis Panel (23 @ 20:26)  Hepatitis C Virus Interpretation: Nonreact: Hepatitis C AB  S/CO Ratio                        Interpretation  < 1.00                                   Non-Reactive  1.00 - 4.99                         Weakly-Reactive  >= 5.00                                Reactive  Non-Reactive: A person witha non-reactive HCV antibody result is  considered uninfected.  No further action is needed unless recent  infection is suspected.  In these cases, consider repeat testing later to  detect seroconversion..  Weakly-Reactive: HCV antibody test is abnormal, HCV RNA Qualitative test  will follow.  Reactive: HCV antibody test is abnormal, HCV RNA Qualitative test will  follow.  Note: HCV antibody testing is performed on the Abbott  system.    Hepatitis C Virus S/CO Ratio: 0.53 S/CO    Hepatitis B Core IgM Antibody: Nonreact    Hepatitis B Surface Antigen: Nonreact    Hepatitis A IgM Antibody: Nonreact  Ferritin, Serum (23 @ 20:26)    Ferritin, Serum: 1351 ng/mL    Cortisol AM, Serum . (23 @ 07:14)    Cortisol AM, Serum: 12.2 ug/dL    Iron with Total Binding Capacity (23 @ 20:26)    Iron - Total Binding Capacity.: 192 ug/dL    % Saturation, Iron: 10 %    Iron Total, Serum: 19 ug/dL    Unsaturated Iron Binding Capacity: 173 ug/dL    Vitamin B12, Serum (23 @ 20:26)    Vitamin B12, Serum: 1515 pg/mL    Vitamin D, 25-Hydroxy (23 @ 20:26)    Vitamin D, 25-Hydroxy: 17.8:     RECENT CULTURES:  Culture - Blood (23 @ 12:36)    Specimen Source: .Blood None    Culture Results:   No growth to date.    Culture - Blood (23 @ 12:36)    Specimen Source: .Blood None    Culture Results:   No growth to date.    Cardiac testing: reviewed   EKG   12 Lead ECG (23 @ 11:53) >  Ventricular Rate 76 BPM  QTC Calculation(Bazett) 402 ms  Diagnosis Line Normal sinus rhythm  Nonspecific T wave abnormality  No previous ECGs available    US Abdomen Complete (US Abdomen Complete .) (23 @ 07:52) >  FINDINGS:  Liver: Enlarged, measuring 21 cm in length..  Bile ducts: Normal caliber. Common bile duct measures 2 mm.  Gallbladder: Within normal limits.  Pancreas: Visualized portions are within normal limits.  Spleen: 12.7 cm. Within normal limits.  Right kidney: 12.4 cm. No hydronephrosis. Echogenic kidney. Trace   perinephric fluid.  Left kidney: 13.8 cm. No hydronephrosis. Echogenic kidney.  Aorta and IVC: Visualized portions are within normal limits.  IMPRESSION:  Hepatomegaly.  Echogenic kidneys which can be seen with medical renal disease. Trace   right perinephric fluid.     CT Lower Extremity w/ IV Cont, Left (23 @ 13:26) >  Large peripherally enhancing complex appearing fluid collection at the   lateral aspect of the vastus lateralis muscle at the level the proximal   third of the femur consistent with an intramuscular abscess. Adjacent   soft tissue swelling compatible with cellulitis.    Xray Chest 1 View- PORTABLE-Urgent (Xray Chest 1 View- PORTABLE-Urgent .) (23 @ 22:53)  ACC: [id number] EXAM:  XR CHEST PORTABLE URGENT 1V   ORDERED BY: DAJUAN FERNANDEZ   PROCEDURE DATE:  2023    INTERPRETATION:  AP chest on 2023 at 10:43 PM. Patient has   chest pressure and fever.  COMPARISON: None available.  Heart magnified by technique.  Lungs are clear.  IMPRESSION: No acute finding.  --- End of Report ---    Home Medications:  ascorbic acid 500 mg oral tablet: 1 tab(s) orally once a day (2023 17:10)  ibuprofen 200 mg oral capsule: 1 cap(s) orally every 6 hours, As Needed (2023 17:10)    MEDICATIONS  (STANDING):  cholecalciferol 2000 Unit(s) Oral at bedtime  clindamycin IVPB 900 milliGRAM(s) IV Intermittent every 8 hours  lactated ringers. 1000 milliLiter(s) (100 mL/Hr) IV Continuous <Continuous>    MEDICATIONS  (PRN):  acetaminophen     Tablet .. 650 milliGRAM(s) Oral every 6 hours PRN Temp greater or equal to 38C (100.4F), Mild Pain (1 - 3)  aluminum hydroxide/magnesium hydroxide/simethicone Suspension 30 milliLiter(s) Oral every 4 hours PRN Dyspepsia  bisacodyl 5 milliGRAM(s) Oral daily PRN Constipation  bisacodyl Suppository 10 milliGRAM(s) Rectal once PRN Constipation  calcium carbonate    500 mG (Tums) Chewable 3 Tablet(s) Chew every 6 hours PRN Dyspepsia  HYDROmorphone  Injectable 1 milliGRAM(s) IV Push every 4 hours PRN Severe Pain (7 - 10)  metoclopramide Injectable 10 milliGRAM(s) IV Push every 6 hours PRN Nausea and/or Vomiting not relieved by Zofran  ondansetron Injectable 4 milliGRAM(s) IV Push every 6 hours PRN Nausea  oxyCODONE    IR 10 milliGRAM(s) Oral every 4 hours PRN Moderate Pain (4 - 6)

## 2023-02-23 NOTE — PROGRESS NOTE ADULT - SUBJECTIVE AND OBJECTIVE BOX
Date of service: 02-23-23 @ 08:20    Lying in bed in NAD  Left thigh feels less tender  No fever    ROS: no fever or chills; denies dizziness, no HA, no SOB or cough, no abdominal pain, no diarrhea or constipation; no dysuria    MEDICATIONS  (STANDING):  cefepime   IVPB      cefepime   IVPB 2000 milliGRAM(s) IV Intermittent every 12 hours  cholecalciferol 2000 Unit(s) Oral at bedtime  enoxaparin Injectable 40 milliGRAM(s) SubCutaneous every 24 hours  lactated ringers. 1000 milliLiter(s) (100 mL/Hr) IV Continuous <Continuous>  vancomycin  IVPB 1500 milliGRAM(s) IV Intermittent every 8 hours    Vital Signs Last 24 Hrs  T(C): 36.9 (22 Feb 2023 23:14), Max: 37 (22 Feb 2023 11:16)  T(F): 98.4 (22 Feb 2023 23:14), Max: 98.6 (22 Feb 2023 11:16)  HR: 78 (22 Feb 2023 23:14) (78 - 82)  BP: 128/57 (22 Feb 2023 23:14) (128/57 - 130/61)  BP(mean): --  RR: 18 (22 Feb 2023 23:14) (18 - 18)  SpO2: 99% (22 Feb 2023 23:14) (98% - 99%)    Parameters below as of 22 Feb 2023 23:14  Patient On (Oxygen Delivery Method): room air     Physical exam:    Constitutional:  No acute distress  HEENT: NC/AT, EOMI, PERRLA, conjunctivae clear; ears and nose atraumatic  Neck: supple; thyroid not palpable  Back: no tenderness  Respiratory: respiratory effort normal; clear to auscultation  Cardiovascular: S1S2 regular, no murmurs  Abdomen: soft, not tender, not distended, positive BS; no liver or spleen organomegaly  Genitourinary: no suprapubic tenderness  Lymphatic: no LN palpable  Musculoskeletal: no muscle tenderness, no joint swelling or tenderness  Extremities: no pedal edema  Left thigh tenderness s/p drainage  Neurological/ Psychiatric: AxOx3, judgement and insight normal; moving all extremities  Skin: no rashes; no palpable lesions    Labs: reviewed                        11.0   6.90  )-----------( 451      ( 23 Feb 2023 06:34 )             33.0     02-23    137  |  103  |  17  ----------------------------<  100<H>  4.3   |  32<H>  |  0.89    Ca    8.9      23 Feb 2023 06:34    TPro  6.5  /  Alb  2.2<L>  /  TBili  0.4  /  DBili  x   /  AST  37  /  ALT  114<H>  /  AlkPhos  34<L>  02-23    Vancomycin Level, Trough: 10.5 ug/mL (02-22 @ 12:49)    D-Dimer Assay, Quantitative: 1200 ng/mL DDU (02-21-23 @ 09:32)  C-Reactive Protein, Serum: 176 mg/L (02-21-23 @ 05:58)  Ferritin, Serum: 1351 ng/mL (02-19-23 @ 20:26)  C-Reactive Protein, Serum: 175 mg/L (02-19-23 @ 20:26)                        12.5   10.84 )-----------( 371      ( 19 Feb 2023 07:14 )             36.3     02-19    135  |  101  |  16  ----------------------------<  115<H>  4.0   |  31  |  0.97    Ca    8.5      19 Feb 2023 07:14    TPro  6.8  /  Alb  2.3<L>  /  TBili  0.4  /  DBili  x   /  AST  72<H>  /  ALT  220<H>  /  AlkPhos  36<L>  02-19     LIVER FUNCTIONS - ( 19 Feb 2023 07:14 )  Alb: 2.3 g/dL / Pro: 6.8 gm/dL / ALK PHOS: 36 U/L / ALT: 220 U/L / AST: 72 U/L / GGT: x             Culture - Abscess with Gram Stain (collected 20 Feb 2023 14:58)  Source: .Abscess Left thigh  Preliminary Report (23 Feb 2023 00:12):    Moderate Aggregatibacter aphrophilus "Susceptibilities not performed"    Numerous Streptococcus constellatus "Susceptibilities not performed"    Culture - Blood (collected 18 Feb 2023 12:36)  Source: .Blood None  Preliminary Report (19 Feb 2023 18:02):    No growth to date.    Culture - Blood (collected 18 Feb 2023 12:36)  Source: .Blood None  Preliminary Report (19 Feb 2023 18:02):    No growth to date.    Radiology: all available radiological tests reviewed    < from: CT Lower Extremity w/ IV Cont, Left (02.18.23 @ 13:26) >  Large peripherally enhancing complex appearing fluid collection at the   lateral aspect of the vastus lateralis muscle at the level the proximal   third of the femur consistent with an intramuscular abscess. Adjacent   soft tissue swelling compatible with cellulitis.  < end of copied text >      Advanced directives addressed: full resuscitation

## 2023-02-23 NOTE — PROGRESS NOTE ADULT - SUBJECTIVE AND OBJECTIVE BOX
Feels well, less pain  VSS afeb  Left thigh swelling slightly improved. dark bloody drainage from drain. No warmth. No tenderness.

## 2023-02-23 NOTE — PROGRESS NOTE ADULT - ASSESSMENT
HPI: 32M with no significant PMHx presented to [facility] on 02/18/2023 with left thigh abscess x2 weeks. Pt. reports subjective fevers for the past 2 days. Pt. was seen last week at urgent care and was initially treated with Clinda x3 days, though without improvement. He saw urgent care again and was told to stop taking antibiotic because it was likely a muscle strain. Pt. has since noted increased firmness, no spontaneous drainage, or drainage procedures. He reports a Vitamin B12 injection to the left thigh 3 weeks prior. Pt endorses taking Ibuprofen without improvement.     Sepsis, POA, due to LLE cellulitis with intramuscular abscess s/p drain placement on 2/20   - As per surgery   - 2/20 drainage of left thigh abscess, 350cc drained during procedure  - F/U cultures; abscess culture pending; blood cultures negative  - C/W IV hydration, antipyretics  - CT scan demonstrated abscess decreased in size and that drain is in correct location  - Antibiotics switch from IV to PO clindamycin as per infectious disease    Transaminitis resolving, likely adverse drug effect, multiple supplements   Hepatomegaly  Elevated B12 - advised to stop  - Lipid profile, lipase, amylase, hepatitis acute panel negative, TSH - WDL  - US abdomen demonstrating hepatomegaly  - On multiple supplements at home - creatinine, glutathione, L-arginine, whey protein, collagen, apple cider vinegar, injections of B12, B6 vitamins  - High vitamin B12, low vit-D; will order vit-d supplement  - A1C 5.7  - B6 level WDL  - GI consult Dr. 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  Elevated d-dimers s/p I&D can be falsely elevated due to surgical intervention and sepsis  - S/P IV vitamin K, S/P 10 mg vit K PO, it may not help because cause of coagulopathy likely due to sepsis  - As per hematology, monitor DIC labs - Fibrinogen 1335, D-dimer 1200, PT 21.9, INR 1.88, Factor VII 21, outpatient follow up   - Doppler LE - no DVT  - Low suspicion for PE - no dyspnea, no hypoxia, no chest pain  - Discontinue Lovenox and add venodynes as per hematology recommendations    Chest pain  resolved, due to GERD   - Maalox and TUMS PRN  - EKG reviewed - some T wave inversion  - Troponin negative,   - CXR - clear, no acute findings    Hyperglycemia  - A1C 5.7    Vitamin D deficiency  - Will replace     DVT PPX:  - As per surgery  - Venodynes    Disposition: as per surgery    Time Span: 75 min     Thank you for consult, will follow with you.

## 2023-02-23 NOTE — PROGRESS NOTE ADULT - SUBJECTIVE AND OBJECTIVE BOX
HPI:    33 y/o M otherwise healthy who presented to the ED with L thigh abscess x2 weeks. He admitted that he initially felt pain with localized swelling which slowly progressed and then developed fever/chills. At that point he went to a local urgent care, given Clindamycin which he took for about 3 days, symptoms did not improve, went back to the urgent care, received a xray that did not show anything acute, advised that he likely had a muscle strain. He noted that the symptoms continued to worsen to the point that he came to the ED. He admitted that he is a competing  and takes various amounts of supplements currently including creatine, whey protein, arginine, Vit B6 and Vit B12 injections. He admitted that he has used exogenous testosterone in the past which he has injected into his shoulders/thighs/gluts, but has not used in about 1.5 - 2 years. He admitted the most recent Vit B12 injection was about 3-4 weeks ago before the L thigh symptoms started. He is otherwise well currently and denies any cough, HA, vision/hearing changes, palpitations, SOB, CANSECO, wheezing, abdominal pain, nausea, vomiting, diarrhea, melena, BRBPR, dysuria, hematuria or any other acute c/o.      02/21/2023: Seen at bedside, resting, no acute distress    02/22/2023- seen at bedside, sleeping, but wakes up in no acute distress    02/23/2023: Seen at bedside, no acute distress      PAST MEDICAL & SURGICAL HISTORY:    No significant past surgical history        MEDICATIONS  (STANDING):    cefepime   IVPB      cefepime   IVPB 2000 milliGRAM(s) IV Intermittent every 12 hours  cholecalciferol 2000 Unit(s) Oral at bedtime  enoxaparin Injectable 40 milliGRAM(s) SubCutaneous every 24 hours  lactated ringers. 1000 milliLiter(s) (100 mL/Hr) IV Continuous <Continuous>  vancomycin  IVPB 1500 milliGRAM(s) IV Intermittent every 8 hours      MEDICATIONS  (PRN):    acetaminophen     Tablet .. 650 milliGRAM(s) Oral every 6 hours PRN Temp greater or equal to 38C (100.4F), Mild Pain (1 - 3)  aluminum hydroxide/magnesium hydroxide/simethicone Suspension 30 milliLiter(s) Oral every 4 hours PRN Dyspepsia  bisacodyl 5 milliGRAM(s) Oral daily PRN Constipation  bisacodyl Suppository 10 milliGRAM(s) Rectal once PRN Constipation  calcium carbonate    500 mG (Tums) Chewable 3 Tablet(s) Chew every 6 hours PRN Dyspepsia  HYDROmorphone  Injectable 1 milliGRAM(s) IV Push every 4 hours PRN Severe Pain (7 - 10)  metoclopramide Injectable 10 milliGRAM(s) IV Push every 6 hours PRN Nausea and/or Vomiting not relieved by Zofran  ondansetron Injectable 4 milliGRAM(s) IV Push every 6 hours PRN Nausea  oxyCODONE    IR 10 milliGRAM(s) Oral every 4 hours PRN Moderate Pain (4 - 6)      ALLERGIES:    penicillin (Rash (Mild))        FAMILY HISTORY:    Nothing to note      REVIEW OF SYSTEMS:    Constitutional, Eyes, ENT, Cardiovascular, Respiratory, Gastrointestinal, Genitourinary, Musculoskeletal, Integumentary, Neurological, Psychiatric, Endocrine, Heme/Lymph and Allergic/Immunologic review of systems are otherwise negative except as noted in HPI.       VITALS:    ICU Vital Signs Last 24 Hrs  T(C): 36.9 (23 Feb 2023 08:30), Max: 37 (22 Feb 2023 11:16)  T(F): 98.4 (23 Feb 2023 08:30), Max: 98.6 (22 Feb 2023 11:16)  HR: 70 (23 Feb 2023 08:30) (70 - 82)  BP: 120/73 (23 Feb 2023 08:30) (120/73 - 130/61)  BP(mean): --  ABP: --  ABP(mean): --  RR: 18 (23 Feb 2023 08:30) (18 - 18)  SpO2: 99% (23 Feb 2023 08:30) (98% - 99%)    O2 Parameters below as of 23 Feb 2023 08:30  Patient On (Oxygen Delivery Method): room air        PHYSICAL:    Constitutional: no acute distress  Eyes:  no conjunctival infection, anicteric  ENT: pharynx is unremarkable, moist mucus membrane, no oral lesions  Neck: supple without JVD   Pulmonary: clear to auscultation bilaterally, no dullness, no wheezing  Cardiac: RRR, normal S1S2  Vascular: no JVD, no calf tenderness, venous stasis changes, varices  Abdomen: normoactive bowel sounds, soft and nontender, no hepatosplenomegaly or masses appreciated  Lymphatic: no peripheral adenopathy appreciated  Musculoskeletal: full range of motion and no deformities appreciated x 4 extremities; L thigh with swelling, dressing on IM drain in place L anterior mid thigh region with decreased serosanguinous fluid collection in bag  Skin: normal appearance overall, no vesicles, rashes, lesions  Neurology: grossly intact  Psychiatric: affect/mood appropriate      LABS:    CBC Full  -  ( 23 Feb 2023 06:34 )  WBC Count : 6.90 K/uL  RBC Count : 3.94 M/uL  Hemoglobin : 11.0 g/dL  Hematocrit : 33.0 %  Platelet Count - Automated : 451 K/uL  Mean Cell Volume : 83.8 fl  Mean Cell Hemoglobin : 27.9 pg  Mean Cell Hemoglobin Concentration : 33.3 gm/dL  Auto Neutrophil # : x  Auto Lymphocyte # : x  Auto Monocyte # : x  Auto Eosinophil # : x  Auto Basophil # : x  Auto Neutrophil % : x  Auto Lymphocyte % : x  Auto Monocyte % : x  Auto Eosinophil % : x  Auto Basophil % : x                          11.0   6.90  )-----------( 451      ( 23 Feb 2023 06:34 )             33.0     02-23    137  |  103  |  17  ----------------------------<  100<H>  4.3   |  32<H>  |  0.89    Ca    8.9      23 Feb 2023 06:34    TPro  6.5  /  Alb  2.2<L>  /  TBili  0.4  /  DBili  x   /  AST  37  /  ALT  114<H>  /  AlkPhos  34<L>  02-23    PT/INR - ( 23 Feb 2023 06:34 )   PT: 18.1 sec;   INR: 1.55 ratio         PTT - ( 23 Feb 2023 06:34 )  PTT:29.6 sec    D Dimer 1200  Fibrinogen 1335     Factor VII activity 21 % ( low- N : 50- 165 %)         RADIOLOGY & ADDITIONAL STUDIES:    CT Lower Extremity w/ IV Cont, Left (02.18.23 @ 13:26)      IMPRESSION:    Large peripherally enhancing complex appearing fluid collection at the   lateral aspect of the vastus lateralis muscle at the level the proximal   third of the femur consistent with an intramuscular abscess. Adjacent   soft tissue swelling compatible with cellulitis.        Xray Chest 1 View- PORTABLE-Urgent (Xray Chest 1 View- PORTABLE-Urgent .) (02.19.23 @ 22:53)    IMPRESSION: No acute finding.        US Abdomen Complete (US Abdomen Complete .) (02.20.23 @ 07:52)    IMPRESSION:  Hepatomegaly.    Echogenic kidneys which can be seen with medical renal disease. Trace   right perinephric fluid.        US Duplex Venous Lower Ext Complete, Bilateral (02.21.23 @ 12:45)    IMPRESSION:  No evidence of deep venous thrombosis in either lower extremity        CT Lower Extremity w/ IV Cont, Left (02.22.23 @ 09:50)    IMPRESSION:    Interval decrease in size of abscess along the superficial portion of the   vastus lateralis musculature.  Catheter within the distal portion of the abscess.       HPI:    31 y/o M otherwise healthy who presented to the ED with L thigh abscess x2 weeks. He admitted that he initially felt pain with localized swelling which slowly progressed and then developed fever/chills. At that point he went to a local urgent care, given Clindamycin which he took for about 3 days, symptoms did not improve, went back to the urgent care, received a xray that did not show anything acute, advised that he likely had a muscle strain. He noted that the symptoms continued to worsen to the point that he came to the ED. He admitted that he is a competing  and takes various amounts of supplements currently including creatine, whey protein, arginine, Vit B6 and Vit B12 injections. He admitted that he has used exogenous testosterone in the past which he has injected into his shoulders/thighs/gluts, but has not used in about 1.5 - 2 years. He admitted the most recent Vit B12 injection was about 3-4 weeks ago before the L thigh symptoms started. He is otherwise well currently and denies any cough, HA, vision/hearing changes, palpitations, SOB, CANSECO, wheezing, abdominal pain, nausea, vomiting, diarrhea, melena, BRBPR, dysuria, hematuria or any other acute c/o.      02/21/2023: Seen at bedside, resting, no acute distress    02/22/2023- seen at bedside, sleeping, but wakes up in no acute distress    02/23/2023: Seen at bedside, no acute distress.       PAST MEDICAL & SURGICAL HISTORY:    No significant past surgical history        MEDICATIONS  (STANDING):    cefepime   IVPB      cefepime   IVPB 2000 milliGRAM(s) IV Intermittent every 12 hours  cholecalciferol 2000 Unit(s) Oral at bedtime  enoxaparin Injectable 40 milliGRAM(s) SubCutaneous every 24 hours  lactated ringers. 1000 milliLiter(s) (100 mL/Hr) IV Continuous <Continuous>  vancomycin  IVPB 1500 milliGRAM(s) IV Intermittent every 8 hours      MEDICATIONS  (PRN):    acetaminophen     Tablet .. 650 milliGRAM(s) Oral every 6 hours PRN Temp greater or equal to 38C (100.4F), Mild Pain (1 - 3)  aluminum hydroxide/magnesium hydroxide/simethicone Suspension 30 milliLiter(s) Oral every 4 hours PRN Dyspepsia  bisacodyl 5 milliGRAM(s) Oral daily PRN Constipation  bisacodyl Suppository 10 milliGRAM(s) Rectal once PRN Constipation  calcium carbonate    500 mG (Tums) Chewable 3 Tablet(s) Chew every 6 hours PRN Dyspepsia  HYDROmorphone  Injectable 1 milliGRAM(s) IV Push every 4 hours PRN Severe Pain (7 - 10)  metoclopramide Injectable 10 milliGRAM(s) IV Push every 6 hours PRN Nausea and/or Vomiting not relieved by Zofran  ondansetron Injectable 4 milliGRAM(s) IV Push every 6 hours PRN Nausea  oxyCODONE    IR 10 milliGRAM(s) Oral every 4 hours PRN Moderate Pain (4 - 6)      ALLERGIES:    penicillin (Rash (Mild))        FAMILY HISTORY:    Nothing to note      REVIEW OF SYSTEMS:    Constitutional, Eyes, ENT, Cardiovascular, Respiratory, Gastrointestinal, Genitourinary, Musculoskeletal, Integumentary, Neurological, Psychiatric, Endocrine, Heme/Lymph and Allergic/Immunologic review of systems are otherwise negative except as noted in HPI.       VITALS:    ICU Vital Signs Last 24 Hrs  T(C): 36.9 (23 Feb 2023 08:30), Max: 37 (22 Feb 2023 11:16)  T(F): 98.4 (23 Feb 2023 08:30), Max: 98.6 (22 Feb 2023 11:16)  HR: 70 (23 Feb 2023 08:30) (70 - 82)  BP: 120/73 (23 Feb 2023 08:30) (120/73 - 130/61)  BP(mean): --  ABP: --  ABP(mean): --  RR: 18 (23 Feb 2023 08:30) (18 - 18)  SpO2: 99% (23 Feb 2023 08:30) (98% - 99%)    O2 Parameters below as of 23 Feb 2023 08:30  Patient On (Oxygen Delivery Method): room air        PHYSICAL:    Constitutional: no acute distress  Eyes:  no conjunctival infection, anicteric  ENT: pharynx is unremarkable, moist mucus membrane, no oral lesions  Neck: supple without JVD   Pulmonary: clear to auscultation bilaterally, no dullness, no wheezing  Cardiac: RRR, normal S1S2  Vascular: no JVD, no calf tenderness, venous stasis changes, varices  Abdomen: normoactive bowel sounds, soft and nontender, no hepatosplenomegaly or masses appreciated  Lymphatic: no peripheral adenopathy appreciated  Musculoskeletal: full range of motion and no deformities appreciated x 4 extremities; L thigh with swelling, dressing on IM drain in place L anterior mid thigh region with decreased serosanguinous fluid collection in bag  Skin: normal appearance overall, no vesicles, rashes, lesions  Neurology: grossly intact  Psychiatric: affect/mood appropriate      LABS:    CBC Full  -  ( 23 Feb 2023 06:34 )  WBC Count : 6.90 K/uL  RBC Count : 3.94 M/uL  Hemoglobin : 11.0 g/dL  Hematocrit : 33.0 %  Platelet Count - Automated : 451 K/uL  Mean Cell Volume : 83.8 fl  Mean Cell Hemoglobin : 27.9 pg  Mean Cell Hemoglobin Concentration : 33.3 gm/dL  Auto Neutrophil # : x  Auto Lymphocyte # : x  Auto Monocyte # : x  Auto Eosinophil # : x  Auto Basophil # : x  Auto Neutrophil % : x  Auto Lymphocyte % : x  Auto Monocyte % : x  Auto Eosinophil % : x  Auto Basophil % : x                          11.0   6.90  )-----------( 451      ( 23 Feb 2023 06:34 )             33.0     02-23    137  |  103  |  17  ----------------------------<  100<H>  4.3   |  32<H>  |  0.89    Ca    8.9      23 Feb 2023 06:34    TPro  6.5  /  Alb  2.2<L>  /  TBili  0.4  /  DBili  x   /  AST  37  /  ALT  114<H>  /  AlkPhos  34<L>  02-23    PT/INR - ( 23 Feb 2023 06:34 )   PT: 18.1 sec;   INR: 1.55 ratio         PTT - ( 23 Feb 2023 06:34 )  PTT:29.6 sec    D Dimer 1200  Fibrinogen 1335     Factor VII activity 21 % ( low- N : 50- 165 %)         RADIOLOGY & ADDITIONAL STUDIES:    CT Lower Extremity w/ IV Cont, Left (02.18.23 @ 13:26)      IMPRESSION:    Large peripherally enhancing complex appearing fluid collection at the   lateral aspect of the vastus lateralis muscle at the level the proximal   third of the femur consistent with an intramuscular abscess. Adjacent   soft tissue swelling compatible with cellulitis.        Xray Chest 1 View- PORTABLE-Urgent (Xray Chest 1 View- PORTABLE-Urgent .) (02.19.23 @ 22:53)    IMPRESSION: No acute finding.        US Abdomen Complete (US Abdomen Complete .) (02.20.23 @ 07:52)    IMPRESSION:  Hepatomegaly.    Echogenic kidneys which can be seen with medical renal disease. Trace   right perinephric fluid.        US Duplex Venous Lower Ext Complete, Bilateral (02.21.23 @ 12:45)    IMPRESSION:  No evidence of deep venous thrombosis in either lower extremity        CT Lower Extremity w/ IV Cont, Left (02.22.23 @ 09:50)    IMPRESSION:    Interval decrease in size of abscess along the superficial portion of the   vastus lateralis musculature.  Catheter within the distal portion of the abscess.

## 2023-02-23 NOTE — PROGRESS NOTE ADULT - ATTENDING COMMENTS
32M with no significant PMHx presented to [facility] on 02/18/2023 with left thigh abscess x2 weeks. Pt. reports subjective fevers for the past 2 days. Pt. was seen last week at urgent care and was initially treated with Clinda x3 days, though without improvement. He saw urgent care again and was told to stop taking antibiotic because it was likely a muscle strain. Pt. has since noted increased firmness, no spontaneous drainage, or drainage procedures. He reports a Vitamin B12 injection to the left thigh 3 weeks prior. Pt endorses taking Ibuprofen without improvement.    Hospital Course:  02/20 - drainage of left thigh abscess; 350cc drained during procedure    02/21 - Pt. seen and examined at bedside earlier today,  feels better, less pain in the leg, + febrile , tolerating IV abx  2/22 - pt seen and examined, feels better, low grade fever, denies cp, dyspnea abdominal pain  2/23- pt feels better, leg pain and sweeling improving, afebrile    Vital sings reviewed for last 24 h  T(C): 36.9 (02-23-23 @ 08:30), Max: 36.9 (02-22-23 @ 23:14)  T(F): 98.4 (02-23-23 @ 08:30), Max: 98.4 (02-22-23 @ 23:14)  HR: 70 (02-23-23 @ 08:30) (70 - 78)  BP: 120/73 (02-23-23 @ 08:30) (120/73 - 130/61)  RR: 18 (02-23-23 @ 08:30) (18 - 18)  SpO2: 99% (02-23-23 @ 08:30) (99% - 99%)  Wt(kg): --  Daily     Daily   CAPILLARY BLOOD GLUCOSE              Physical exam:   General : NAD, appear to be of stated age, well groomed   NERVOUS SYSTEM:  A&Ox3, non-focal exam, motor Strength 5/5 bilateral upper and lower extremities; DTRs 2+ intact and symmetric  HEAD:  Atraumatic, Normocephalic  EYES: EOMI, PERRLA, conjunctiva and sclera clear  HEENT: Moist mucous membranes, supple neck, no JVD  CHEST: Clear to auscultation bilaterally; No rales, no rhonchi, no wheezing  HEART: Regular rate and rhythm; No murmurs, no rubs or gallops  ABDOMEN: Soft, non-tender, Non-distended; Bowel sounds present, no guarding , no peritoneal irritation   GENITOURINARY: Voiding, no suprapubic tenderness  EXTREMITIES:  2+ Peripheral Pulses, No clubbing, cyanosis, edema  MUSCULOSKELETAL: No muscle tenderness, muscle tone normal, no joint tenderness or swelling noted; FROM  SKIN: No rash, Left thigh drain in place with serosanguinous discharge     Labs radiologic and other test: all reviewed and interpret    A/P  1. Sepsis, POA, due to LLE cellulitis with intramuscular abscess s/p drain placement on 2/20   - As per surgery ,  C/W IV abx - cefepime and vanco ,  F/U cultures,  C/W IV hydration, antipyretics  s/p  2/20 drainage of left thigh abscess, 350cc drained during procedure    2. Transaminitis resolving, likely adverse drug effect, multiple supplements , Hepatomegaly, Elevated B12 - advised to stop  - Lipid profile, lipase, amylase, hepatitis acute panel neg, TSH WDL,  US abdomen demonstrating hepatomegaly  - On multiple supplements - creatinine, glutathione, L-arginine, whey protein, collagen, apple cider vinegar, injections of B12, B6 vitamins  - High vitamin B12, low vit-D; will order vit-d supplement,  B6 - pending  - GI consult Dr. Jain - no further work-up needed , o/p follow up , hepatomegaly mild    3. 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 Chest pain  resolved, due to GERD  -  Maalox and TUMS PRN,  EKG reviewed - some T wave inversion,  Troponin negative, ,  CXR - clear, no acute findings    5. Hyperglycemia due to prediabetes -  A1C 5.7     6. Vitamin D deficiency - Will replace     DVT proph - IPc    Thank you for consult, will follow with you.

## 2023-02-23 NOTE — PROGRESS NOTE ADULT - ASSESSMENT
33 y/o M with no significant known PMHx admitted for L thigh abscess, received surgical I&D with drain placement 02/20, found to have prolonged PT/ elevated INR with normal PTT.      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; cannot differentiate how much it is due to diminished production secondary to hepatic dysfunction versus hereditary Factor VII deficiency  - Vit K deficiency also can result in low Factor VII,  but he has no reasons to be vit K deficient, received sufficient Vit K supplementation during admission  - Possible component of consumption due to low grade DIC; elevated D Dimer; elevated Fibrinogen ---> acute phase reaction due to acute infection  - Will need follow up coags & Factor VII after infection resolves in outpatient setting, 1-2 months       # LLE Cellulitis with IM Abscess    - The patient has a hx of repetitive IM injections of exogenous testosterone, last use 1.5 - 2 years ago, now currently self injecting Vit B12 q 3-4 weeks in the same areas   - 'Spontaneous IM abscesses can be caused by immunodeficiency, trauma, drug injections, concurrent infection, and malnutrition. Local injection site infection and abscess extension into muscle tissue should not be confused with true pyomyositis which is caused by hematogenous seeding of muscle groups distant from injection sites'  - S/p I&D 02/20   - ID following; Continue Vanco & Clindamycin  - Management Primary, Surgery & ID teams      # Transaminitis with Hepatomegaly     - Abdominal US imaging revealed hepatomegaly   - Lipid profile, lipase, amylase, hepatitis acute panel negative  - No known excessive EtOH consumption   - LFTs improved during admission, ALT still elevated   - Albumin low despite high calorie/protein diet   - Currently taking multiple supplements (creatine, glutathione, whey protein, collagen, apple cider vinegar, Vit B6 also Vit B12 injections) which could be contributory    - As per GI; hepatomegaly / liver dysfunction not significant       31 y/o M with no significant known PMHx admitted for L thigh abscess, received surgical I&D with drain placement 02/20, found to have prolonged PT/ elevated INR with normal PTT.      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; cannot differentiate how much it is due to diminished production secondary to hepatic dysfunction versus hereditary Factor VII deficiency  - Vit K deficiency also can result in low Factor VII,  but he has no reasons to be vit K deficient, received sufficient Vit K supplementation during admission  - Possible component of consumption due to low grade DIC; elevated D Dimer; elevated Fibrinogen ---> acute phase reaction due to acute infection  - Will need follow up coags & Factor VII after infection resolves in outpatient setting, 1-2 months   - D/c Lovenox at this time; continue ambulation and Venodynes      # LLE Cellulitis with IM Abscess    - The patient has a hx of repetitive IM injections of exogenous testosterone, last use 1.5 - 2 years ago, now currently self injecting Vit B12 q 3-4 weeks in the same areas   - 'Spontaneous IM abscesses can be caused by immunodeficiency, trauma, drug injections, concurrent infection, and malnutrition. Local injection site infection and abscess extension into muscle tissue should not be confused with true pyomyositis which is caused by hematogenous seeding of muscle groups distant from injection sites'  - S/p I&D 02/20   - ID following; Continue Vanco & Clindamycin  - Management Primary, Surgery & ID teams      # Transaminitis with Hepatomegaly     - Abdominal US imaging revealed hepatomegaly   - Lipid profile, lipase, amylase, hepatitis acute panel negative  - No known excessive EtOH consumption   - LFTs improved during admission, ALT still elevated   - Albumin low despite high calorie/protein diet   - Currently taking multiple supplements (creatine, glutathione, whey protein, collagen, apple cider vinegar, Vit B6 also Vit B12 injections) which could be contributory    - As per GI; hepatomegaly / liver dysfunction not significant       31 y/o M with no significant known PMHx admitted for L thigh abscess, received surgical I&D with drain placement 02/20, found to have prolonged PT/ elevated INR with normal PTT.      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 Our office will call him to schedule .    - D/c Lovenox at this time; continue ambulation and Venodynes      # LLE Cellulitis with IM Abscess    - The patient has a hx of repetitive IM injections of exogenous testosterone, last use 1.5 - 2 years ago, now currently self injecting Vit B12 q 3-4 weeks in the same areas   - 'Spontaneous IM abscesses can be caused by immunodeficiency, trauma, drug injections, concurrent infection, and malnutrition. Local injection site infection and abscess extension into muscle tissue should not be confused with true pyomyositis which is caused by hematogenous seeding of muscle groups distant from injection sites'  - S/p I&D 02/20   - ID following; Continue Vanco & Clindamycin  - Management Primary, Surgery & ID teams      # Transaminitis with Hepatomegaly     - Abdominal US imaging revealed hepatomegaly   - Lipid profile, lipase, amylase, hepatitis acute panel negative  - No known excessive EtOH consumption   - LFTs improved during admission, ALT still elevated   - Albumin low despite high calorie/protein diet   - Currently taking multiple supplements (creatine, glutathione, whey protein, collagen, apple cider vinegar, Vit B6 also Vit B12 injections) which could be contributory    - As per GI; hepatomegaly / liver dysfunction not significant

## 2023-02-23 NOTE — PROGRESS NOTE ADULT - ASSESSMENT
33 y/o male otherwise healthy was admitted on 2/18 with left thigh abscess x 2 weeks. Pt reports subjective fevers for the past 2 days PTA. Pt was seen last week at , was initially treated with Clinda x 3 days, though without improvement. He saw  again and was told to stop taking antibiotic because it was likely a muscle strain. Pt has since noted increased firmness, no spontaneous drainage or drainage procedures. He reports a Vitamin B12 injection to the left thigh 3 weeks prior. Pt endorses taking Ibuprofen without improvement. In ER he received clindamycin IV.    1. Left thigh cellulitis and abscess with STCO and AGAP s/p drainage. Allergy to PCN with rash.   -febrile syndrome resolved  -BC x 2 and cultures noted  -son vancomycin 1500 mg IV q12h and cefepime 2 gm IV q12h  -tolerating abx well so far; no side effects noted  -monitor closely in karie of PCN allergy history  -surgical evaluation appreciated  -f/u cultures  -change abx to clindamycin 300 mg PO q6h for 14 more days or longer until thigh erythema and edema are resolved  -f/u with surgery as outpatient  -monitor temps  -f/u CBC  -supportive care  2. Other issues:   -care per medicine

## 2023-02-24 ENCOUNTER — TRANSCRIPTION ENCOUNTER (OUTPATIENT)
Age: 33
End: 2023-02-24

## 2023-02-24 VITALS
TEMPERATURE: 98 F | OXYGEN SATURATION: 100 % | SYSTOLIC BLOOD PRESSURE: 123 MMHG | DIASTOLIC BLOOD PRESSURE: 68 MMHG | HEART RATE: 72 BPM | RESPIRATION RATE: 18 BRPM

## 2023-02-24 LAB
ALBUMIN SERPL ELPH-MCNC: 2.3 G/DL — LOW (ref 3.3–5)
ALP SERPL-CCNC: 31 U/L — LOW (ref 40–120)
ALT FLD-CCNC: 119 U/L — HIGH (ref 12–78)
ANION GAP SERPL CALC-SCNC: 2 MMOL/L — LOW (ref 5–17)
AST SERPL-CCNC: 40 U/L — HIGH (ref 15–37)
BILIRUB SERPL-MCNC: 0.3 MG/DL — SIGNIFICANT CHANGE UP (ref 0.2–1.2)
BUN SERPL-MCNC: 21 MG/DL — SIGNIFICANT CHANGE UP (ref 7–23)
CALCIUM SERPL-MCNC: 9 MG/DL — SIGNIFICANT CHANGE UP (ref 8.5–10.1)
CHLORIDE SERPL-SCNC: 104 MMOL/L — SIGNIFICANT CHANGE UP (ref 96–108)
CO2 SERPL-SCNC: 31 MMOL/L — SIGNIFICANT CHANGE UP (ref 22–31)
CREAT SERPL-MCNC: 1.01 MG/DL — SIGNIFICANT CHANGE UP (ref 0.5–1.3)
CRP SERPL-MCNC: 45 MG/L — HIGH
EGFR: 102 ML/MIN/1.73M2 — SIGNIFICANT CHANGE UP
GLUCOSE SERPL-MCNC: 90 MG/DL — SIGNIFICANT CHANGE UP (ref 70–99)
HCT VFR BLD CALC: 35.4 % — LOW (ref 39–50)
HGB BLD-MCNC: 11.7 G/DL — LOW (ref 13–17)
MCHC RBC-ENTMCNC: 27.6 PG — SIGNIFICANT CHANGE UP (ref 27–34)
MCHC RBC-ENTMCNC: 33.1 GM/DL — SIGNIFICANT CHANGE UP (ref 32–36)
MCV RBC AUTO: 83.5 FL — SIGNIFICANT CHANGE UP (ref 80–100)
PLATELET # BLD AUTO: 499 K/UL — HIGH (ref 150–400)
POTASSIUM SERPL-MCNC: 4.5 MMOL/L — SIGNIFICANT CHANGE UP (ref 3.5–5.3)
POTASSIUM SERPL-SCNC: 4.5 MMOL/L — SIGNIFICANT CHANGE UP (ref 3.5–5.3)
PROT SERPL-MCNC: 6.8 GM/DL — SIGNIFICANT CHANGE UP (ref 6–8.3)
RBC # BLD: 4.24 M/UL — SIGNIFICANT CHANGE UP (ref 4.2–5.8)
RBC # FLD: 14.7 % — HIGH (ref 10.3–14.5)
SODIUM SERPL-SCNC: 137 MMOL/L — SIGNIFICANT CHANGE UP (ref 135–145)
WBC # BLD: 6.59 K/UL — SIGNIFICANT CHANGE UP (ref 3.8–10.5)
WBC # FLD AUTO: 6.59 K/UL — SIGNIFICANT CHANGE UP (ref 3.8–10.5)

## 2023-02-24 PROCEDURE — 99232 SBSQ HOSP IP/OBS MODERATE 35: CPT

## 2023-02-24 RX ORDER — ACETAMINOPHEN 500 MG
2 TABLET ORAL
Qty: 0 | Refills: 0 | DISCHARGE
Start: 2023-02-24

## 2023-02-24 RX ADMIN — Medication 100 MILLIGRAM(S): at 13:12

## 2023-02-24 RX ADMIN — Medication 100 MILLIGRAM(S): at 06:27

## 2023-02-24 NOTE — PROGRESS NOTE ADULT - PROVIDER SPECIALTY LIST ADULT
Surgery
Heme/Onc
Surgery
Heme/Onc
Hospitalist
Infectious Disease
Surgery
Surgery
Hospitalist
Hospitalist

## 2023-02-24 NOTE — PROGRESS NOTE ADULT - ATTENDING COMMENTS
32M with no significant PMHx presented to [facility] on 02/18/2023 with left thigh abscess x2 weeks. Pt. reports subjective fevers for the past 2 days. Pt. was seen last week at urgent care and was initially treated with Clinda x3 days, though without improvement. He saw urgent care again and was told to stop taking antibiotic because it was likely a muscle strain. Pt. has since noted increased firmness, no spontaneous drainage, or drainage procedures. He reports a Vitamin B12 injection to the left thigh 3 weeks prior. Pt endorses taking Ibuprofen without improvement.    Hospital Course:  02/20 - drainage of left thigh abscess; 350cc drained during procedure    02/21 - Pt. seen and examined at bedside earlier today,  feels better, less pain in the leg, + febrile , tolerating IV abx  2/22 - pt seen and examined, feels better, low grade fever, denies cp, dyspnea abdominal pain  2/23- pt feels better, leg pain and sweeling improving, afebrile  2/24 - afebrile, swelling improving, pain minimal, plan as per surgical team    Vital sings reviewed for last 24 h  T(C): 36.9 (02-24-23 @ 15:58), Max: 36.9 (02-24-23 @ 15:58)  T(F): 98.5 (02-24-23 @ 15:58), Max: 98.5 (02-24-23 @ 15:58)  HR: 72 (02-24-23 @ 15:58) (71 - 72)  BP: 123/68 (02-24-23 @ 15:58) (108/54 - 123/68)  RR: 18 (02-24-23 @ 15:58) (18 - 18)  SpO2: 100% (02-24-23 @ 15:58) (98% - 100%)  Wt(kg): --  Daily     Daily   CAPILLARY BLOOD GLUCOSE                  Physical exam:   General : NAD, appear to be of stated age, well groomed   NERVOUS SYSTEM:  A&Ox3, non-focal exam, motor Strength 5/5 bilateral upper and lower extremities; DTRs 2+ intact and symmetric  HEAD:  Atraumatic, Normocephalic  EYES: EOMI, PERRLA, conjunctiva and sclera clear  HEENT: Moist mucous membranes, supple neck, no JVD  CHEST: Clear to auscultation bilaterally; No rales, no rhonchi, no wheezing  HEART: Regular rate and rhythm; No murmurs, no rubs or gallops  ABDOMEN: Soft, non-tender, Non-distended; Bowel sounds present, no guarding , no peritoneal irritation   GENITOURINARY: Voiding, no suprapubic tenderness  EXTREMITIES:  2+ Peripheral Pulses, No clubbing, cyanosis, edema  MUSCULOSKELETAL: No muscle tenderness, muscle tone normal, no joint tenderness or swelling noted; FROM  SKIN: No rash, Left thigh drain in place with serosanguinous discharge     Labs radiologic and other test: all reviewed and interpret    A/P  1. Sepsis, POA, due to LLE cellulitis with intramuscular abscess s/p drain placement on 2/20   - As per surgery ,  C/W IV abx - cefepime and vanco ,  F/U cultures - neg ,  C/W IV hydration, antipyretics  s/p  2/20 drainage of left thigh abscess, 350cc drained during procedure    2. Transaminitis resolving, likely adverse drug effect, multiple supplements , Hepatomegaly, Elevated B12 - advised to stop  - Lipid profile, lipase, amylase, hepatitis acute panel neg, TSH WDL,  US abdomen demonstrating hepatomegaly  - On multiple supplements - creatinine, glutathione, L-arginine, whey protein, collagen, apple cider vinegar, injections of B12, B6 vitamins  - High vitamin B12, low vit-D; will order vit-d supplement,  B6 - wnl  - GI consult Dr. Jain - no further work-up needed , o/p follow up , hepatomegaly mild    3. 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 Chest pain  resolved, due to GERD, resolved   -  Maalox and TUMS PRN,  EKG reviewed - some T wave inversion,  Troponin negative, ,  CXR - clear, no acute findings    5. Hyperglycemia due to prediabetes -  A1C 5.7     6. Vitamin D deficiency - Will replace     DVT proph - IPc    Thank you for consult, will follow with you.    Dispo - as per surgery

## 2023-02-24 NOTE — DISCHARGE NOTE NURSING/CASE MANAGEMENT/SOCIAL WORK - NSDCPEFALRISK_GEN_ALL_CORE
For information on Fall & Injury Prevention, visit: [email or web address] OR  [email or web address] OR  https://www.cdc.gov/steadi/patient.html

## 2023-02-24 NOTE — DISCHARGE NOTE PROVIDER - HOSPITAL COURSE
33 y/o male otherwise healthy who presented today with left thigh abscess x 2 weeks. Pt reports subjective fevers for the past 2 days. Pt was seen last week at , was initially treated with Clinda x 3 days, though without improvement. He saw  again and was told to stop taking antibiotic because it was likely a muscle strain. Pt has since noted increased firmness, no spontaneous drainage or drainage procedures. He reports a Vitamin B12 injection to the left thigh 3 weeks prior. Pt endorses taking Ibuprofen without improvement.  CT scan of thigh showed intramuscular abscess. He was started on IV abx. Began spiking temps. Abscess drained with pigtail catheter. Gradually improved but leg continued to swell. Venous doppler negative for DVT. Seen by [facility] for elevated INR. Likely componenty of DIC

## 2023-02-24 NOTE — DISCHARGE NOTE PROVIDER - CARE PROVIDER_API CALL
Demetrius Soto  SURGERY  [address] [place] [address]  Phone: [phone]  Fax: [phone]  Follow Up Time:

## 2023-02-24 NOTE — PROGRESS NOTE ADULT - REASON FOR ADMISSION
Left thigh abscess

## 2023-02-24 NOTE — PROGRESS NOTE ADULT - SUBJECTIVE AND OBJECTIVE BOX
Chief complain:  Left thigh pain     HPI: 32M with no significant PMHx presented to [facility] on 2023 with left thigh abscess x2 weeks. Pt. reports subjective fevers for the past 2 days. Pt. was seen last week at urgent care and was initially treated with Clinda x3 days, though without improvement. He saw urgent care again and was told to stop taking antibiotic because it was likely a muscle strain. Pt. has since noted increased firmness, no spontaneous drainage, or drainage procedures. He reports a Vitamin B12 injection to the left thigh 3 weeks prior. Pt endorses taking Ibuprofen without improvement.    Hospital Course:   - drainage of left thigh abscess; 350cc drained during procedure    Interval History:   - Patient seen and examined at bedside earlier today, febrile, pain in the leg persist, percocet 5 mg not effective, events noted overnight, chest tightness, denies cough, cp, dyspnea now, denies abdominal pain, tolerating po diet, plan discussed.   - Pt. seen and examined at bedside earlier today, feels better, less pain in the leg, + febrile, tolerating IV abx   - Pt. seen and examined at bedside earlier today. Feeling much better, no more fevers and no longer requiring pain medications. Continues to tolerate IV antibiotics. Denies CP, SOB, cough, or abdominal pain.   - Pt. seen and examined at bedside earlier today. Feels weak but better, no fevers, pain is well-controlled without medication. Continues to tolerate IV antibiotics. Denies CP, SOB, cough, or abdominal pain at this time.   - Pt. seen and examined at bedside earlier today. Pt. states he is feeling better, concerned with the output from his drain. Reassured pt. and discussed POC, pt. agreeable. Swelling around abscess and drain continues to improve. Denies CP, SOB, cough, abdominal pain, or pain at drain site.    Review of system - Rest of the review of system are negative except mentioned in HPI.    Vital Signs Last 24 Hrs  T(C): 36.7 (2023 09:17), Max: 36.9 (2023 16:19)  T(F): 98.1 (2023 09:17), Max: 98.4 (2023 16:19)  HR: 72 (2023 09:17) (71 - 73)  BP: 112/59 (2023 09:17) (108/54 - 123/60)  RR: 18 (2023 09:17) (18 - 18)  SpO2: 100% (2023 09:17) (98% - 100%)  Parameters below as of 2023 09:17  Patient On (Oxygen Delivery Method): room air    Physical exam:   General : NAD, appear to be of stated age, well groomed   NERVOUS SYSTEM:  A&Ox3, non-focal exam, motor Strength 5/5 bialteral upper and lower extremities; DTRs 2+ intact and symmetric  HEAD:  Atraumatic, Normocephalic  EYES: EOMI, PERRLA, conjunctiva and sclera clear  HEENT: Moist mucous membranes, supple neck, no JVD  CHEST: Clear to auscultation bilaterally; No rales, no rhonchi, no wheezing  HEART: Regular rate and rhythm; No murmurs, no rubs or gallops  ABDOMEN: Soft, non-tender, Non-distended; Bowel sounds present, no guarding , no peritoneal irritation   GENITOURINARY: Voiding, no suprapubic tenderness  EXTREMITIES:  2+ Peripheral Pulses, No clubbing, cyanosis, edema  MUSCULOSKELETAL: No muscle tenderness, muscle tone normal, no joint tenderness or swelling noted; FROM  SKIN: No rash, Left thigh drain in place with serosanguinous discharge    Labs radiologic and other test: all reviewed and interpret       137  |  104  |  21  ----------------------------<  90  4.5   |  31  |  1.01    Ca    9.0      2023 06:28    TPro  6.8  /  Alb  2.3<L>  /  TBili  0.3  /  DBili  x   /  AST  40<H>  /  ALT  119<H>  /  AlkPhos  31<L>                 11.7   6.59  )-----------( 499      ( 2023 06:28 )             35.4     LIVER FUNCTIONS - ( 2023 06:28 )  Alb: 2.3 g/dL / Pro: 6.8 gm/dL / ALK PHOS: 31 U/L / ALT: 119 U/L / AST: 40 U/L / GGT: x           PT/INR - ( 2023 06:34 )   PT: 18.1 sec;   INR: 1.55 ratio      PTT - ( 2023 06:34 )  PTT:29.6 sec    Urinalysis Basic - ( 2023 23:27 )  Color: Yellow / Appearance: Clear / S.015 / pH: x  Gluc: x / Ketone: Negative  / Bili: Negative / Urobili: 8   Blood: x / Protein: Negative / Nitrite: Negative   Leuk Esterase: Negative / RBC: x / WBC x   Sq Epi: x / Non Sq Epi: x / Bacteria: x    Acute Hepatitis Panel (23 @ 20:26)  Hepatitis C Virus Interpretation: Nonreact: Hepatitis C AB  S/CO Ratio                        Interpretation  < 1.00                                   Non-Reactive  1.00 - 4.99                         Weakly-Reactive  >= 5.00                                Reactive  Non-Reactive: A person witha non-reactive HCV antibody result is  considered uninfected.  No further action is needed unless recent  infection is suspected.  In these cases, consider repeat testing later to  detect seroconversion..  Weakly-Reactive: HCV antibody test is abnormal, HCV RNA Qualitative test  will follow.  Reactive: HCV antibody test is abnormal, HCV RNA Qualitative test will  follow.  Note: HCV antibody testing is performed on the Abbott  system.    Hepatitis C Virus S/CO Ratio: 0.53 S/CO    Hepatitis B Core IgM Antibody: Nonreact    Hepatitis B Surface Antigen: Nonreact    Hepatitis A IgM Antibody: Nonreact  Ferritin, Serum (23 @ 20:26)    Ferritin, Serum: 1351 ng/mL    Cortisol AM, Serum . (23 @ 07:14)    Cortisol AM, Serum: 12.2 ug/dL    Iron with Total Binding Capacity (23 @ 20:26)    Iron - Total Binding Capacity.: 192 ug/dL    % Saturation, Iron: 10 %    Iron Total, Serum: 19 ug/dL    Unsaturated Iron Binding Capacity: 173 ug/dL    Vitamin B12, Serum (23 @ 20:26)    Vitamin B12, Serum: 1515 pg/mL    Vitamin D, 25-Hydroxy (23 @ 20:26)    Vitamin D, 25-Hydroxy: 17.8:     RECENT CULTURES:  Culture - Blood (23 @ 12:36)    Specimen Source: .Blood None    Culture Results:   No growth to date.    Culture - Blood (23 @ 12:36)    Specimen Source: .Blood None    Culture Results:   No growth to date.    Cardiac testing: reviewed   EKG   12 Lead ECG (23 @ 11:53) >  Ventricular Rate 76 BPM  QTC Calculation(Bazett) 402 ms  Diagnosis Line Normal sinus rhythm  Nonspecific T wave abnormality  No previous ECGs available    US Abdomen Complete (US Abdomen Complete .) (23 @ 07:52) >  FINDINGS:  Liver: Enlarged, measuring 21 cm in length..  Bile ducts: Normal caliber. Common bile duct measures 2 mm.  Gallbladder: Within normal limits.  Pancreas: Visualized portions are within normal limits.  Spleen: 12.7 cm. Within normal limits.  Right kidney: 12.4 cm. No hydronephrosis. Echogenic kidney. Trace   perinephric fluid.  Left kidney: 13.8 cm. No hydronephrosis. Echogenic kidney.  Aorta and IVC: Visualized portions are within normal limits.  IMPRESSION:  Hepatomegaly.  Echogenic kidneys which can be seen with medical renal disease. Trace   right perinephric fluid.     CT Lower Extremity w/ IV Cont, Left (23 @ 13:26) >  Large peripherally enhancing complex appearing fluid collection at the   lateral aspect of the vastus lateralis muscle at the level the proximal   third of the femur consistent with an intramuscular abscess. Adjacent   soft tissue swelling compatible with cellulitis.    Xray Chest 1 View- PORTABLE-Urgent (Xray Chest 1 View- PORTABLE-Urgent .) (23 @ 22:53)  ACC: [id number] EXAM:  XR CHEST PORTABLE URGENT 1V   ORDERED BY: DAJUAN FERNANDEZ   PROCEDURE DATE:  2023    INTERPRETATION:  AP chest on 2023 at 10:43 PM. Patient has   chest pressure and fever.  COMPARISON: None available.  Heart magnified by technique.  Lungs are clear.  IMPRESSION: No acute finding.  --- End of Report ---    Home Medications:  ascorbic acid 500 mg oral tablet: 1 tab(s) orally once a day (2023 17:10)  ibuprofen 200 mg oral capsule: 1 cap(s) orally every 6 hours, As Needed (2023 17:10)    MEDICATIONS  (STANDING):  cholecalciferol 2000 Unit(s) Oral at bedtime  clindamycin IVPB 900 milliGRAM(s) IV Intermittent every 8 hours    MEDICATIONS  (PRN):  acetaminophen     Tablet .. 650 milliGRAM(s) Oral every 6 hours PRN Temp greater or equal to 38C (100.4F), Mild Pain (1 - 3)  aluminum hydroxide/magnesium hydroxide/simethicone Suspension 30 milliLiter(s) Oral every 4 hours PRN Dyspepsia  bisacodyl 5 milliGRAM(s) Oral daily PRN Constipation  bisacodyl Suppository 10 milliGRAM(s) Rectal once PRN Constipation  calcium carbonate    500 mG (Tums) Chewable 3 Tablet(s) Chew every 6 hours PRN Dyspepsia  HYDROmorphone  Injectable 0.5 milliGRAM(s) IV Push every 4 hours PRN Severe Pain (7 - 10)  metoclopramide Injectable 10 milliGRAM(s) IV Push every 6 hours PRN Nausea and/or Vomiting not relieved by Zofran  ondansetron Injectable 4 milliGRAM(s) IV Push every 6 hours PRN Nausea  oxyCODONE    IR 5 milliGRAM(s) Oral every 6 hours PRN Moderate Pain (4 - 6)

## 2023-02-24 NOTE — PROGRESS NOTE ADULT - NS ATTEND AMEND GEN_ALL_CORE FT
Per A/P in progress note
Per A/P in progress note
per A/P in progress note   D/w patient
Per A/P in progress note

## 2023-02-24 NOTE — DISCHARGE NOTE PROVIDER - NSDCMRMEDTOKEN_GEN_ALL_CORE_FT
acetaminophen 325 mg oral tablet: 2 tab(s) orally every 6 hours, As needed, Temp greater or equal to 38C (100.4F), Mild Pain (1 - 3)  ascorbic acid 500 mg oral tablet: 1 tab(s) orally once a day  clindamycin 300 mg oral capsule: 1 cap(s) orally every 6 hours   ibuprofen 200 mg oral capsule: 1 cap(s) orally every 6 hours, As Needed

## 2023-02-24 NOTE — DISCHARGE NOTE NURSING/CASE MANAGEMENT/SOCIAL WORK - PATIENT PORTAL LINK FT
You can access the FollowMyHealth Patient Portal offered by [facility] by registering at the following website: [email or web address]. By joining [facility]’s FollowMyHealth portal, you will also be able to view your health information using other applications (apps) compatible with our system.

## 2023-02-24 NOTE — PROGRESS NOTE ADULT - ASSESSMENT
HPI: 32M with no significant PMHx presented to [facility] on 02/18/2023 with left thigh abscess x2 weeks. Pt. reports subjective fevers for the past 2 days. Pt. was seen last week at urgent care and was initially treated with Clinda x3 days, though without improvement. He saw urgent care again and was told to stop taking antibiotic because it was likely a muscle strain. Pt. has since noted increased firmness, no spontaneous drainage, or drainage procedures. He reports a Vitamin B12 injection to the left thigh 3 weeks prior. Pt endorses taking Ibuprofen without improvement.     Sepsis, POA, due to LLE cellulitis with intramuscular abscess s/p drain placement on 2/20   - As per surgery   - 2/20 drainage of left thigh abscess, 350cc drained during procedure  - F/U cultures; abscess culture pending; blood cultures negative  - C/W IV hydration, antipyretics  - CT scan demonstrated abscess decreased in size and that drain is in correct location  - Antibiotics switch from IV to PO clindamycin as per infectious disease    Transaminitis resolving, likely adverse drug effect, multiple supplements   Hepatomegaly  Elevated B12 - advised to stop  - Lipid profile, lipase, amylase, hepatitis acute panel negative, TSH - WDL  - US abdomen demonstrating hepatomegaly  - On multiple supplements at home - creatinine, glutathione, L-arginine, whey protein, collagen, apple cider vinegar, injections of B12, B6 vitamins  - High vitamin B12, low vit-D; will order vit-d supplement  - A1C 5.7  - B6 level WDL  - GI consult Dr. 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  Elevated d-dimers s/p I&D can be falsely elevated due to surgical intervention and sepsis  - S/P IV vitamin K, S/P 10 mg vit K PO, it may not help because cause of coagulopathy likely due to sepsis  - As per hematology, monitor DIC labs - Fibrinogen 1335, D-dimer 1200, PT 21.9, INR 1.88, Factor VII 21, outpatient follow up   - Doppler LE - no DVT  - Low suspicion for PE - no dyspnea, no hypoxia, no chest pain  - Discontinue Lovenox and add venodynes as per hematology recommendations    Chest pain  resolved, due to GERD   - Maalox and TUMS PRN  - EKG reviewed - some T wave inversion  - Troponin negative,   - CXR - clear, no acute findings    Hyperglycemia  - A1C 5.7    Vitamin D deficiency  - Will replace     DVT PPX:  - As per surgery  - Venodynes    Disposition: as per surgery    Time Span: 75 min     Thank you for consult, will follow with you.

## 2023-02-25 LAB
CULTURE RESULTS: SIGNIFICANT CHANGE UP
SPECIMEN SOURCE: SIGNIFICANT CHANGE UP

## 2023-02-27 LAB
A-TOCOPHEROL VIT E SERPL-MCNC: 7.9 MG/L — SIGNIFICANT CHANGE UP (ref 5.9–19.4)
BETA+GAMMA TOCOPHEROL SERPL-MCNC: 1.2 MG/L — SIGNIFICANT CHANGE UP (ref 0.7–4.9)

## 2023-03-01 LAB — VIT A SERPL-MCNC: 11.3 UG/DL — LOW (ref 18.9–57.3)

## 2023-03-03 DIAGNOSIS — R73.9 HYPERGLYCEMIA, UNSPECIFIED: ICD-10-CM

## 2023-03-03 DIAGNOSIS — K21.9 GASTRO-ESOPHAGEAL REFLUX DISEASE WITHOUT ESOPHAGITIS: ICD-10-CM

## 2023-03-03 DIAGNOSIS — D65 DISSEMINATED INTRAVASCULAR COAGULATION [DEFIBRINATION SYNDROME]: ICD-10-CM

## 2023-03-03 DIAGNOSIS — R16.0 HEPATOMEGALY, NOT ELSEWHERE CLASSIFIED: ICD-10-CM

## 2023-03-03 DIAGNOSIS — L03.116 CELLULITIS OF LEFT LOWER LIMB: ICD-10-CM

## 2023-03-03 DIAGNOSIS — A41.9 SEPSIS, UNSPECIFIED ORGANISM: ICD-10-CM
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
## 2023-03-03 DIAGNOSIS — R74.01 ELEVATION OF LEVELS OF LIVER TRANSAMINASE LEVELS: ICD-10-CM

## 2023-03-03 DIAGNOSIS — L02.416 CUTANEOUS ABSCESS OF LEFT LOWER LIMB: ICD-10-CM

## 2023-03-03 DIAGNOSIS — E55.9 VITAMIN D DEFICIENCY, UNSPECIFIED: ICD-10-CM

## 2023-03-03 DIAGNOSIS — T80.29XA INFECTION FOLLOWING OTHER INFUSION, TRANSFUSION AND THERAPEUTIC INJECTION, INITIAL ENCOUNTER: ICD-10-CM

## 2023-03-03 DIAGNOSIS — M60.062 INFECTIVE MYOSITIS, LEFT LOWER LEG: ICD-10-CM

## 2023-03-03 DIAGNOSIS — Z88.0 ALLERGY STATUS TO PENICILLIN: ICD-10-CM

## 2023-03-08 PROBLEM — Z78.9 OTHER SPECIFIED HEALTH STATUS: Chronic | Status: ACTIVE | Noted: 2023-02-22

## 2023-03-21 NOTE — PROVIDER CONTACT NOTE (CHANGE IN STATUS NOTIFICATION) - ACTION/TREATMENT ORDERED:
You were admitted to the hospital for evaluation of chest pain. You underwent an echocardiogram for further evaluation of your heart, the results may be discussed with you at your follow up appointments. Please follow up with your PCP within one week, and cardiology within one week (numbers have been attached to this packet). You will be discharged to home, you may advance your activity as you seem fit, you will continue your home medications. We have added on a medication called jardiance, which has been sent to your pharmacy. It is a 10mg tablet that you will be taking daily. Please continue with a healthy diet - we recommend a cardiac diet - additional resources attached to this packet.        Summary of your Discharge Medications        Take these Medications        Details   acetaminophen 325 MG tablet  Commonly known as: TYLENOL   Take 2 tablets by mouth every 4 hours as needed for Pain.     apixaBAN 5 MG Tab  Commonly known as: ELIQUIS   Take 1 tablet by mouth every 12 hours.     atorvastatin 20 MG tablet  Commonly known as: LIPITOR   Take 1 tablet by mouth daily.     empagliflozin 10 MG tablet  Commonly known as: JARDIANCE   Take 1 tablet by mouth daily.     Entresto 49-51 MG per tablet   Generic drug: sacubitril-valsartan  Take 1 tablet by mouth in the morning and 1 tablet in the evening.     escitalopram 5 MG tablet  Commonly known as: LEXAPRO   Take 1 tablet by mouth daily.     gabapentin 300 MG capsule  Commonly known as: NEURONTIN   Take 2 capsules by mouth nightly.     metoPROLOL succinate 50 MG 24 hr tablet  Commonly known as: TOPROL-XL   Take 1 tablet by mouth daily.  Comment: Pt. Needs appt, please call our office     pantoprazole 40 MG tablet  Commonly known as: PROTONIX   TAKE 1 TABLET BY MOUTH NIGHTLY                
cxr ordered and done, tylenol given as ordered, will send urine

## 2023-03-28 PROBLEM — Z00.00 ENCOUNTER FOR PREVENTIVE HEALTH EXAMINATION: Status: ACTIVE | Noted: 2023-03-28

## 2023-03-29 ENCOUNTER — OUTPATIENT (OUTPATIENT)
Dept: OUTPATIENT SERVICES | Facility: HOSPITAL | Age: 33
LOS: 1 days | Discharge: HOME | End: 2023-03-29

## 2023-03-29 DIAGNOSIS — D64.9 ANEMIA, UNSPECIFIED: ICD-10-CM

## 2023-03-31 ENCOUNTER — APPOINTMENT (OUTPATIENT)
Dept: HEMATOLOGY ONCOLOGY | Facility: CLINIC | Age: 33
End: 2023-03-31

## 2023-03-31 DIAGNOSIS — R79.1 ABNORMAL COAGULATION PROFILE: ICD-10-CM

## 2024-05-06 ENCOUNTER — APPOINTMENT (OUTPATIENT)
Dept: FAMILY MEDICINE | Facility: CLINIC | Age: 34
End: 2024-05-06
Payer: COMMERCIAL

## 2024-05-06 VITALS
HEIGHT: 71 IN | HEART RATE: 76 BPM | TEMPERATURE: 98.8 F | OXYGEN SATURATION: 98 % | BODY MASS INDEX: 31.42 KG/M2 | SYSTOLIC BLOOD PRESSURE: 124 MMHG | DIASTOLIC BLOOD PRESSURE: 76 MMHG | WEIGHT: 224.4 LBS

## 2024-05-06 DIAGNOSIS — Z83.3 FAMILY HISTORY OF DIABETES MELLITUS: ICD-10-CM

## 2024-05-06 DIAGNOSIS — Z80.3 FAMILY HISTORY OF MALIGNANT NEOPLASM OF BREAST: ICD-10-CM

## 2024-05-06 DIAGNOSIS — D64.9 ANEMIA, UNSPECIFIED: ICD-10-CM

## 2024-05-06 DIAGNOSIS — Z80.42 FAMILY HISTORY OF MALIGNANT NEOPLASM OF PROSTATE: ICD-10-CM

## 2024-05-06 DIAGNOSIS — Z00.00 ENCOUNTER FOR GENERAL ADULT MEDICAL EXAMINATION W/OUT ABNORMAL FINDINGS: ICD-10-CM

## 2024-05-06 DIAGNOSIS — Z92.241 PERSONAL HISTORY OF SYSTEMIC STEROID THERAPY: ICD-10-CM

## 2024-05-06 DIAGNOSIS — J30.2 OTHER SEASONAL ALLERGIC RHINITIS: ICD-10-CM

## 2024-05-06 DIAGNOSIS — Z78.9 OTHER SPECIFIED HEALTH STATUS: ICD-10-CM

## 2024-05-06 DIAGNOSIS — Z02.89 ENCOUNTER FOR OTHER ADMINISTRATIVE EXAMINATIONS: ICD-10-CM

## 2024-05-06 PROCEDURE — 99385 PREV VISIT NEW AGE 18-39: CPT

## 2024-05-06 PROCEDURE — 36415 COLL VENOUS BLD VENIPUNCTURE: CPT

## 2024-05-06 PROCEDURE — 99395 PREV VISIT EST AGE 18-39: CPT

## 2024-05-06 NOTE — HISTORY OF PRESENT ILLNESS
[FreeTextEntry1] : CPE [de-identified] : 34 yo M, new patient presents to establish care. PMH anemia and Prolonged PT.  He states he labs done for Immigration paperwork completed today. He has an appt with a Civil Surgeon to complete the forms.  He immigrated from [place] in 2022  He states in 02/24 he was in [facility] for a leg abscess.  He was told he had anemia and high liver enzymes.   He admits to taking Anabolic steroids and growth factors for muscle enlargement.  He is on a Keto diet M-Fri and then eats carbs on the weekends.

## 2024-05-06 NOTE — ASSESSMENT
[FreeTextEntry1] : Labs and titers drawn for immigration forms.  He has apt with Civil Surgeon to complete/sign forms.  Repeat labs for anemia, CPK, LFTs- check for sickle cell trait  Advised not to use Anabolic steroids and growth hormones.  Discussed risks associated with this.   This visit was conducted as part of ongoing, longitudinal medical care for the patient's chronic medical diagnoses and other issues.

## 2024-05-06 NOTE — HEALTH RISK ASSESSMENT
[No] : In the past 12 months have you used drugs other than those required for medical reasons? No [0] : 2) Feeling down, depressed, or hopeless: Not at all (0) [Never] : Never [Excellent] : ~his/her~  mood as  excellent [No falls in past year] : Patient reported no falls in the past year [PHQ-2 Negative - No further assessment needed] : PHQ-2 Negative - No further assessment needed [None] : None [Employed] : employed [Fully functional (bathing, dressing, toileting, transferring, walking, feeding)] : Fully functional (bathing, dressing, toileting, transferring, walking, feeding) [Fully functional (using the telephone, shopping, preparing meals, housekeeping, doing laundry, using] : Fully functional and needs no help or supervision to perform IADLs (using the telephone, shopping, preparing meals, housekeeping, doing laundry, using transportation, managing medications and managing finances) [Audit-CScore] : 0 [de-identified] : weight lifting [[id number]] : 0 [de-identified] : Keto [Reports changes in hearing] : Reports no changes in hearing [Reports changes in vision] : Reports no changes in vision [Reports changes in dental health] : Reports no changes in dental health

## 2024-05-10 LAB
ALBUMIN SERPL ELPH-MCNC: 4.7 G/DL
ALP BLD-CCNC: 50 U/L
ALT SERPL-CCNC: 96 U/L
ANION GAP SERPL CALC-SCNC: 12 MMOL/L
APPEARANCE: CLEAR
AST SERPL-CCNC: 84 U/L
BASOPHILS # BLD AUTO: 0.03 K/UL
BASOPHILS NFR BLD AUTO: 0.6 %
BILIRUB SERPL-MCNC: 0.6 MG/DL
BILIRUBIN URINE: NEGATIVE
BLOOD URINE: NEGATIVE
BUN SERPL-MCNC: 19 MG/DL
C TRACH RRNA SPEC QL NAA+PROBE: NOT DETECTED
CALCIUM SERPL-MCNC: 9.7 MG/DL
CHLORIDE SERPL-SCNC: 102 MMOL/L
CHOLEST SERPL-MCNC: 207 MG/DL
CK SERPL-CCNC: 2430 U/L
CO2 SERPL-SCNC: 26 MMOL/L
COLOR: YELLOW
CREAT SERPL-MCNC: 1.13 MG/DL
EGFR: 89 ML/MIN/1.73M2
EOSINOPHIL # BLD AUTO: 0.34 K/UL
EOSINOPHIL NFR BLD AUTO: 6.4 %
ESTIMATED AVERAGE GLUCOSE: 114 MG/DL
GLUCOSE QUALITATIVE U: NEGATIVE MG/DL
GLUCOSE SERPL-MCNC: 97 MG/DL
HBA1C MFR BLD HPLC: 5.6 %
HBV SURFACE AB SER QL: NONREACTIVE
HCT VFR BLD CALC: 47.3 %
HCV AB SER QL: NONREACTIVE
HCV S/CO RATIO: 0.53 S/CO
HDLC SERPL-MCNC: 60 MG/DL
HGB BLD-MCNC: 15.5 G/DL
IMM GRANULOCYTES NFR BLD AUTO: 0.2 %
KETONES URINE: NEGATIVE MG/DL
LDLC SERPL CALC-MCNC: 139 MG/DL
LEUKOCYTE ESTERASE URINE: NEGATIVE
LYMPHOCYTES # BLD AUTO: 1.08 K/UL
LYMPHOCYTES NFR BLD AUTO: 20.2 %
M TB IFN-G BLD-IMP: NEGATIVE
MAN DIFF?: NORMAL
MCHC RBC-ENTMCNC: 29.4 PG
MCHC RBC-ENTMCNC: 32.8 GM/DL
MCV RBC AUTO: 89.6 FL
MEV IGG FLD QL IA: 77 AU/ML
MEV IGG+IGM SER-IMP: POSITIVE
MONOCYTES # BLD AUTO: 0.4 K/UL
MONOCYTES NFR BLD AUTO: 7.5 %
MUV AB SER-ACNC: POSITIVE
MUV IGG SER QL IA: 86.1 AU/ML
N GONORRHOEA RRNA SPEC QL NAA+PROBE: NOT DETECTED
NEUTROPHILS # BLD AUTO: 3.48 K/UL
NEUTROPHILS NFR BLD AUTO: 65.1 %
NITRITE URINE: NEGATIVE
NONHDLC SERPL-MCNC: 147 MG/DL
PH URINE: 6
PLATELET # BLD AUTO: 155 K/UL
POTASSIUM SERPL-SCNC: 4.3 MMOL/L
PROT SERPL-MCNC: 7.3 G/DL
PROTEIN URINE: NEGATIVE MG/DL
PSA SERPL-MCNC: 0.15 NG/ML
QUANTIFERON TB PLUS MITOGEN MINUS NIL: 7.06 IU/ML
QUANTIFERON TB PLUS NIL: 0.03 IU/ML
QUANTIFERON TB PLUS TB1 MINUS NIL: 0 IU/ML
QUANTIFERON TB PLUS TB2 MINUS NIL: 0.02 IU/ML
RBC # BLD: 5.28 M/UL
RBC # FLD: 11.9 %
RPR SER-TITR: NORMAL
RUBV IGG FLD-ACNC: 4.7 INDEX
RUBV IGG SER-IMP: POSITIVE
SICKLE SCREEN: NEGATIVE
SODIUM SERPL-SCNC: 140 MMOL/L
SOURCE AMPLIFICATION: NORMAL
SPECIFIC GRAVITY URINE: 1.03
TRIGL SERPL-MCNC: 41 MG/DL
TSH SERPL-ACNC: 1.88 UIU/ML
UROBILINOGEN URINE: 0.2 MG/DL
VZV AB TITR SER: POSITIVE
VZV IGG SER IF-ACNC: 1575 INDEX
WBC # FLD AUTO: 5.34 K/UL

## 2024-05-14 ENCOUNTER — NON-APPOINTMENT (OUTPATIENT)
Age: 34
End: 2024-05-14

## 2024-05-29 ENCOUNTER — MED ADMIN CHARGE (OUTPATIENT)
Age: 34
End: 2024-05-29

## 2024-05-29 ENCOUNTER — APPOINTMENT (OUTPATIENT)
Dept: INTERNAL MEDICINE | Facility: CLINIC | Age: 34
End: 2024-05-29
Payer: COMMERCIAL

## 2024-05-29 PROCEDURE — 90471 IMMUNIZATION ADMIN: CPT

## 2024-05-29 PROCEDURE — 90715 TDAP VACCINE 7 YRS/> IM: CPT

## 2024-06-02 DIAGNOSIS — Z23 ENCOUNTER FOR IMMUNIZATION: ICD-10-CM

## 2024-06-03 ENCOUNTER — MED ADMIN CHARGE (OUTPATIENT)
Age: 34
End: 2024-06-03

## 2024-06-03 ENCOUNTER — APPOINTMENT (OUTPATIENT)
Dept: INTERNAL MEDICINE | Facility: CLINIC | Age: 34
End: 2024-06-03
Payer: COMMERCIAL

## 2024-06-03 PROCEDURE — 90472 IMMUNIZATION ADMIN EACH ADD: CPT

## 2024-06-03 PROCEDURE — 90632 HEPA VACCINE ADULT IM: CPT

## 2024-06-03 PROCEDURE — G0010: CPT

## 2024-06-03 PROCEDURE — 90746 HEPB VACCINE 3 DOSE ADULT IM: CPT

## 2024-06-19 ENCOUNTER — LABORATORY RESULT (OUTPATIENT)
Age: 34
End: 2024-06-19

## 2024-06-19 ENCOUNTER — APPOINTMENT (OUTPATIENT)
Dept: FAMILY MEDICINE | Facility: CLINIC | Age: 34
End: 2024-06-19
Payer: COMMERCIAL

## 2024-06-19 VITALS
DIASTOLIC BLOOD PRESSURE: 62 MMHG | TEMPERATURE: 99.4 F | OXYGEN SATURATION: 99 % | HEART RATE: 61 BPM | SYSTOLIC BLOOD PRESSURE: 124 MMHG | HEIGHT: 71 IN | WEIGHT: 224 LBS | BODY MASS INDEX: 31.36 KG/M2

## 2024-06-19 DIAGNOSIS — R74.8 ABNORMAL LEVELS OF OTHER SERUM ENZYMES: ICD-10-CM

## 2024-06-19 DIAGNOSIS — M79.10 MYALGIA, UNSPECIFIED SITE: ICD-10-CM

## 2024-06-19 PROCEDURE — G2211 COMPLEX E/M VISIT ADD ON: CPT | Mod: NC

## 2024-06-19 PROCEDURE — 99214 OFFICE O/P EST MOD 30 MIN: CPT

## 2024-06-19 PROCEDURE — 36415 COLL VENOUS BLD VENIPUNCTURE: CPT

## 2024-06-19 NOTE — HISTORY OF PRESENT ILLNESS
[de-identified] : Follow up for high CPK and LFTs He states he has decreased his protein supplements.   He was taking creatine, citruline, L arginine, caffeine, B6, B12. CLA, protein, collagen, glutamine He is on a Keto diet M-Fri and then eats carbs on the weekends. He fasts during the day and only drinks water during workouts He does not drink alcohol He denies anabolic steroids  He is a , he is sponsored by a supplement company.  He plans to compete He states he has severe muscle pain after working out.  he states he has trouble walking after workouts.

## 2024-06-25 LAB
ALBUMIN SERPL ELPH-MCNC: 4.7 G/DL
ALP BLD-CCNC: 54 U/L
ALT SERPL-CCNC: 56 U/L
ANA SER IF-ACNC: NEGATIVE
ANION GAP SERPL CALC-SCNC: 13 MMOL/L
AST SERPL-CCNC: 54 U/L
BILIRUB SERPL-MCNC: 0.6 MG/DL
BUN SERPL-MCNC: 25 MG/DL
CALCIUM SERPL-MCNC: 9.7 MG/DL
CHLORIDE SERPL-SCNC: 101 MMOL/L
CK SERPL-CCNC: 1474 U/L
CO2 SERPL-SCNC: 26 MMOL/L
CREAT SERPL-MCNC: 1.36 MG/DL
EGFR: 71 ML/MIN/1.73M2
GLUCOSE SERPL-MCNC: 86 MG/DL
HBV SURFACE AG SER QL: NONREACTIVE
HEPATITIS A IGG ANTIBODY: NONREACTIVE
IRON SATN MFR SERPL: 35 %
IRON SERPL-MCNC: 96 UG/DL
POTASSIUM SERPL-SCNC: 4.1 MMOL/L
PROT SERPL-MCNC: 7.2 G/DL
SODIUM SERPL-SCNC: 140 MMOL/L
TIBC SERPL-MCNC: 275 UG/DL
UIBC SERPL-MCNC: 178 UG/DL

## 2024-06-27 ENCOUNTER — NON-APPOINTMENT (OUTPATIENT)
Age: 34
End: 2024-06-27

## 2024-07-03 ENCOUNTER — APPOINTMENT (OUTPATIENT)
Dept: INTERNAL MEDICINE | Facility: CLINIC | Age: 34
End: 2024-07-03

## 2024-07-03 DIAGNOSIS — Z23 ENCOUNTER FOR IMMUNIZATION: ICD-10-CM

## 2025-01-17 NOTE — BRIEF OPERATIVE NOTE - OPERATION/FINDINGS
intramuscular abscess drained with 12 Fr pigtail catheter. 350 cc of pus drained. Cultures obtained independent

## 2025-01-23 ENCOUNTER — MED ADMIN CHARGE (OUTPATIENT)
Age: 35
End: 2025-01-23

## 2025-01-23 ENCOUNTER — APPOINTMENT (OUTPATIENT)
Dept: INTERNAL MEDICINE | Facility: CLINIC | Age: 35
End: 2025-01-23

## 2025-01-23 PROCEDURE — 90471 IMMUNIZATION ADMIN: CPT

## 2025-01-23 PROCEDURE — 90746 HEPB VACCINE 3 DOSE ADULT IM: CPT

## 2025-02-08 NOTE — CONSULT NOTE ADULT - ASSESSMENT
33 y/o M with no significant known PMHx admitted for L thigh abscess, received surgical I&D with drain placement 02/20, found to have prolonged PT and elevated INR.       33 y/o M with no significant known PMHx admitted for L thigh abscess, received surgical I&D with drain placement 02/20, found to have prolonged PT and elevated INR.      # LLE Cellulitis with IM Abscess    - Likely 2/2 to currently self injecting Vit B12 q 3-4 weeks  - Past Hx self injecting exogenous testosterone  - S/p I&D 02/20   - ID following; Continue Cefepime & Vanco; f/u cultures  - Continue supportive measures including IVF, antipyretics        # Transaminitis with Hepatomegaly     - Abdominal US imaging revealed hepatomegaly   - Lipid profile, lipase, amylase, hepatitis acute panel negative  - No known excessive EtOH consumption   - LFTs improved during admission, ALT still elevated   - Albumin low despite high calorie/protein diet   - Currently taking multiple supplements (creatine, glutathione, whey protein, collagen, apple cider vinegar, Vit B6 also Vit B12 injections) which could be contributory    - Recommend GI consult, may need advanced imaging studies      # Coagulopathy (Prolonged PT & Elevated INR) in setting of Sepsis & Hepatic Dysfunction    - S/p oral and IV Vit K  - Likely 2/2 hepatic dysfunction, compounded by sepsis  - Continue to monitor serial coagulation/bleeding time panels  - Okay to give Lovenox during admission in addition to Venodynes and ambulation  - Will send Factor VII, DDimer, Fibrinogen to r/o deficiencies and/or potential DIC 31 y/o M with no significant known PMHx admitted for L thigh abscess, received surgical I&D with drain placement 02/20, found to have prolonged PT/ elevated INR with normal PTT.      # LLE Cellulitis with IM Abscess    - Likely 2/2 to currently self injecting Vit B12 q 3-4 weeks  - Past Hx self injecting exogenous testosterone  - S/p I&D 02/20   - ID following; Continue Cefepime & Vanco; f/u cultures  - Continue supportive measures including IVF, antipyretics        # Transaminitis with Hepatomegaly     - Abdominal US imaging revealed hepatomegaly   - Lipid profile, lipase, amylase, hepatitis acute panel negative  - No known excessive EtOH consumption   - LFTs improved during admission, ALT still elevated   - Albumin low despite high calorie/protein diet   - Currently taking multiple supplements (creatine, glutathione, whey protein, collagen, apple cider vinegar, Vit B6 also Vit B12 injections) which could be contributory    - Recommend GI consult, may need advanced imaging studies      # Coagulopathy (Prolonged PT & Elevated INR) in setting of Sepsis & Hepatic Dysfunction    - S/p oral and IV Vit K  - Likely 2/2 hepatic dysfunction  - Continue to monitor serial coagulation  - Okay to give Lovenox during admission in addition to Venodynes and ambulation  - Will send Factor VII, DDimer, Fibrinogen to r/o deficiencies and/or potential DIC    Addendum : Hem Onc attending  Patient seen and examined today morning together with Hem Onc PA  Isolated prolonged PT with normal PT  Differential diagnosis:  -  low factor VII due to deficiency in hepatic synthetic function - possible - has hepatomegaly, abnormal LFT's, low albumin level- undiagnosed  liver disease due to intake of multiple OTC supplements ??;   Will need GI evaluation   - hereditary factor VII deficiency ( but no family hx , no hx of prolonged bleeding after minor cuts; did not have any major hemostatic challenges / surgeries)  - vit K deficiency ( not very likely- no hx of prolonged antibiotics, no hx of ETOH/ obvious dietary deficiencies)  - acquired factor VII inhibitor- extremely rare  - rarely DIC can cause prolonged PT with normal PTT - check DIC panel.  Ordered Factor VII activity and DIC panel.  No 33 y/o M with no significant known PMHx admitted for L thigh abscess, received surgical I&D with drain placement 02/20, found to have prolonged PT/ elevated INR with normal PTT.      # LLE Cellulitis with IM Abscess    - The patient has a hx of repetitive IM injections of exogenous testosterone, last use 1.5 - 2 years ago, now currently self injecting Vit B12 q 3-4 weeks in the same areas   - 'Spontaneous IM abscesses can be caused by immunodeficiency, trauma, drug injections, concurrent infection, and malnutrition. Local injection site infection and abscess extension into muscle tissue should not be confused with true pyomyositis which is caused by hematogenous seeding of muscle groups distant from injection sites'  - S/p I&D 02/20   - ID following; Continue Cefepime & Vanco; f/u cultures  - Continue supportive measures including IVF, antipyretics        # Transaminitis with Hepatomegaly     - Abdominal US imaging revealed hepatomegaly   - Lipid profile, lipase, amylase, hepatitis acute panel negative  - No known excessive EtOH consumption   - LFTs improved during admission, ALT still elevated   - Albumin low despite high calorie/protein diet   - Currently taking multiple supplements (creatine, glutathione, whey protein, collagen, apple cider vinegar, Vit B6 also Vit B12 injections) which could be contributory    - Recommend GI consult, may need advanced imaging studies      # Coagulopathy (Prolonged PT & Elevated INR) in setting of Sepsis & Hepatic Dysfunction    - S/p oral and IV Vit K  - Likely 2/2 hepatic dysfunction  - Continue to monitor serial coagulation  - Okay to give Lovenox during admission in addition to Venodynes and ambulation  - Will send Factor VII, DDimer, Fibrinogen to r/o deficiencies and/or potential DIC    Addendum : Hem Onc attending  Patient seen and examined today morning together with Hem Onc PA  Isolated prolonged PT with normal PT  Differential diagnosis:  -  low factor VII due to deficiency in hepatic synthetic function - possible - has hepatomegaly, abnormal LFT's, low albumin level- undiagnosed  liver disease due to intake of multiple OTC supplements ??;   Will need GI evaluation   - hereditary factor VII deficiency ( but no family hx , no hx of prolonged bleeding after minor cuts; did not have any major hemostatic challenges / surgeries)  - vit K deficiency ( not very likely- no hx of prolonged antibiotics, no hx of ETOH/ obvious dietary deficiencies)  - acquired factor VII inhibitor- extremely rare  - rarely DIC can cause prolonged PT with normal PTT - check DIC panel.  Ordered Factor VII activity and DIC panel.
